# Patient Record
Sex: FEMALE | Race: BLACK OR AFRICAN AMERICAN | Employment: FULL TIME | URBAN - METROPOLITAN AREA
[De-identification: names, ages, dates, MRNs, and addresses within clinical notes are randomized per-mention and may not be internally consistent; named-entity substitution may affect disease eponyms.]

---

## 2017-01-12 ENCOUNTER — TELEPHONE (OUTPATIENT)
Dept: GYNECOLOGY | Age: 47
End: 2017-01-12

## 2017-02-24 ENCOUNTER — OFFICE VISIT (OUTPATIENT)
Dept: SURGERY | Age: 47
End: 2017-02-24

## 2017-02-24 VITALS
DIASTOLIC BLOOD PRESSURE: 81 MMHG | HEART RATE: 57 BPM | BODY MASS INDEX: 42.32 KG/M2 | SYSTOLIC BLOOD PRESSURE: 115 MMHG | HEIGHT: 65 IN | WEIGHT: 254 LBS

## 2017-02-24 DIAGNOSIS — Z86.718 HISTORY OF BLOOD CLOTS: ICD-10-CM

## 2017-02-24 DIAGNOSIS — I83.813 VARICOSE VEINS OF BOTH LOWER EXTREMITIES WITH PAIN: Primary | ICD-10-CM

## 2017-02-24 DIAGNOSIS — M79.605 PAIN IN BOTH LOWER EXTREMITIES: ICD-10-CM

## 2017-02-24 DIAGNOSIS — I83.93 SPIDER VEINS OF BOTH LOWER EXTREMITIES: ICD-10-CM

## 2017-02-24 DIAGNOSIS — M79.604 PAIN IN BOTH LOWER EXTREMITIES: ICD-10-CM

## 2017-02-24 PROCEDURE — 99213 OFFICE O/P EST LOW 20 MIN: CPT | Performed by: SURGERY

## 2017-02-24 RX ORDER — CLOTRIMAZOLE 1 %
CREAM (GRAM) TOPICAL
COMMUNITY
Start: 2017-02-20 | End: 2020-10-15 | Stop reason: ALTCHOICE

## 2017-02-24 RX ORDER — IBUPROFEN 200 MG
200 TABLET ORAL EVERY 6 HOURS PRN
COMMUNITY
End: 2017-08-03

## 2017-02-24 ASSESSMENT — ENCOUNTER SYMPTOMS: BACK PAIN: 1

## 2017-02-28 ENCOUNTER — TELEPHONE (OUTPATIENT)
Dept: SURGERY | Age: 47
End: 2017-02-28

## 2017-03-13 ENCOUNTER — TELEPHONE (OUTPATIENT)
Dept: SURGERY | Age: 47
End: 2017-03-13

## 2017-03-14 ENCOUNTER — TELEPHONE (OUTPATIENT)
Dept: SURGERY | Age: 47
End: 2017-03-14

## 2017-03-15 ENCOUNTER — TELEPHONE (OUTPATIENT)
Dept: SURGERY | Age: 47
End: 2017-03-15

## 2017-04-03 ENCOUNTER — TELEPHONE (OUTPATIENT)
Dept: SURGERY | Age: 47
End: 2017-04-03

## 2017-05-02 ENCOUNTER — OFFICE VISIT (OUTPATIENT)
Dept: SURGERY | Age: 47
End: 2017-05-02

## 2017-05-02 VITALS
SYSTOLIC BLOOD PRESSURE: 114 MMHG | HEIGHT: 65 IN | WEIGHT: 249.8 LBS | DIASTOLIC BLOOD PRESSURE: 75 MMHG | HEART RATE: 61 BPM | BODY MASS INDEX: 41.62 KG/M2

## 2017-05-02 DIAGNOSIS — M79.605 PAIN IN BOTH LOWER EXTREMITIES: Primary | ICD-10-CM

## 2017-05-02 DIAGNOSIS — D68.59 HYPERCOAGULABLE STATE (HCC): ICD-10-CM

## 2017-05-02 DIAGNOSIS — M79.604 PAIN IN BOTH LOWER EXTREMITIES: Primary | ICD-10-CM

## 2017-05-02 DIAGNOSIS — I63.9 CEREBROVASCULAR ACCIDENT (CVA), UNSPECIFIED MECHANISM (HCC): ICD-10-CM

## 2017-05-02 DIAGNOSIS — I83.813 VARICOSE VEINS OF BOTH LOWER EXTREMITIES WITH PAIN: ICD-10-CM

## 2017-05-02 DIAGNOSIS — Z86.718 HISTORY OF BLOOD CLOTS: ICD-10-CM

## 2017-05-02 PROCEDURE — 99214 OFFICE O/P EST MOD 30 MIN: CPT | Performed by: SURGERY

## 2017-05-02 ASSESSMENT — ENCOUNTER SYMPTOMS
EYE REDNESS: 0
RESPIRATORY NEGATIVE: 1
EYE DISCHARGE: 0
GASTROINTESTINAL NEGATIVE: 1
EYE PAIN: 0
EYE ITCHING: 0
PHOTOPHOBIA: 0
ALLERGIC/IMMUNOLOGIC NEGATIVE: 1

## 2017-10-19 ENCOUNTER — TELEPHONE (OUTPATIENT)
Dept: SURGERY | Age: 47
End: 2017-10-19

## 2017-10-19 NOTE — TELEPHONE ENCOUNTER
Returned the call tot he patient, patient requested to be seen as soon as next week indicating she still has pain of the bilateral calf's.  Patient has been scheduled for 10/24 at end of day

## 2017-10-24 ENCOUNTER — OFFICE VISIT (OUTPATIENT)
Dept: SURGERY | Age: 47
End: 2017-10-24

## 2017-10-24 VITALS
HEART RATE: 71 BPM | HEIGHT: 65 IN | SYSTOLIC BLOOD PRESSURE: 109 MMHG | WEIGHT: 271 LBS | BODY MASS INDEX: 45.15 KG/M2 | DIASTOLIC BLOOD PRESSURE: 78 MMHG

## 2017-10-24 DIAGNOSIS — I83.813 VARICOSE VEINS OF BOTH LOWER EXTREMITIES WITH PAIN: Primary | ICD-10-CM

## 2017-10-24 DIAGNOSIS — M79.604 PAIN IN BOTH LOWER EXTREMITIES: ICD-10-CM

## 2017-10-24 DIAGNOSIS — D68.59 HYPERCOAGULABLE STATE (HCC): ICD-10-CM

## 2017-10-24 DIAGNOSIS — M79.605 PAIN IN BOTH LOWER EXTREMITIES: ICD-10-CM

## 2017-10-24 DIAGNOSIS — Z86.718 HISTORY OF BLOOD CLOTS: ICD-10-CM

## 2017-10-24 PROCEDURE — G8417 CALC BMI ABV UP PARAM F/U: HCPCS | Performed by: SURGERY

## 2017-10-24 PROCEDURE — G8427 DOCREV CUR MEDS BY ELIG CLIN: HCPCS | Performed by: SURGERY

## 2017-10-24 PROCEDURE — 99213 OFFICE O/P EST LOW 20 MIN: CPT | Performed by: SURGERY

## 2017-10-24 PROCEDURE — G8484 FLU IMMUNIZE NO ADMIN: HCPCS | Performed by: SURGERY

## 2017-10-24 PROCEDURE — G8598 ASA/ANTIPLAT THER USED: HCPCS | Performed by: SURGERY

## 2017-10-24 PROCEDURE — 1036F TOBACCO NON-USER: CPT | Performed by: SURGERY

## 2017-10-24 RX ORDER — SULFAMETHOXAZOLE AND TRIMETHOPRIM 800; 160 MG/1; MG/1
1 TABLET ORAL 2 TIMES DAILY
Qty: 20 TABLET | Refills: 0 | Status: SHIPPED | OUTPATIENT
Start: 2017-10-24 | End: 2017-11-03

## 2017-10-24 ASSESSMENT — ENCOUNTER SYMPTOMS
EYES NEGATIVE: 1
RESPIRATORY NEGATIVE: 1
GASTROINTESTINAL NEGATIVE: 1

## 2017-10-24 NOTE — PROGRESS NOTES
Daily Progress Note   Klarissa Puente MD      10/24/2017    Chief Complaint   Patient presents with    Leg Swelling     patient was in ED on 10/18/17 for Left leg swelling. Patient reports pain in both of her legs. She states that her toes go numb also. She rates her pain 3/10. HISTORY OF PRESENT ILLNESS:                The patient is a 50 y.o. female who presents with Left leg pain and swelling. Patient has been seen in the ED as of 10/18/2017 with venous duplex study being perfomed with a result of negative DVT, she reports having pain of bilateral LE also mentioning numbness to her toes, today she states she has mild pain to the LE rating the pain she is experincing 3/10. Patient states its has become painful to sleep with the pillow lodged between her leg. Patient expresses that the Let ankle is swollen in the AM when she awakens. Patient does have Hx of Thrombosis (at  no long term treatment may have been superficial),and has a reported Hx Hypercoagulable state. Patient had been approved for the following procedure of:   Left Leg Ligation, Division and Complete Stripping of the Lesser Saphenous Vein with Stab Phlebectomies, Patient had put this procedure off for some time for unknown reasons.       Past Medical History:   Diagnosis Date    Anemia     Clotting disorder ([address]     Depression     Fibroid     Hx of blood clots     Rt leg lovenox but no long term RX at     STD (sexually transmitted disease)        Past Surgical History:   Procedure Laterality Date    FINGER SURGERY      TUBAL LIGATION         Social History     Social History    Marital status: Single     Spouse name: N/A    Number of children: N/A    Years of education: N/A     Occupational History    .       Social History Main Topics    Smoking status: Never Smoker    Smokeless tobacco: Never Used    Alcohol use No    Drug use: No    Sexual activity: No     Other Topics Concern    Not on file - 0.2 K/uL Final    Sodium 10/18/2017 137  136 - 145 mmol/L Final    Potassium 10/18/2017 3.9  3.5 - 5.1 mmol/L Final    Chloride 10/18/2017 102  99 - 110 mmol/L Final    CO2 10/18/2017 22  21 - 32 mmol/L Final    Anion Gap 10/18/2017 13  3 - 16 Final    Glucose 10/18/2017 110* 70 - 99 mg/dL Final    BUN 10/18/2017 11  7 - 20 mg/dL Final    CREATININE 10/18/2017 0.5* 0.6 - 1.1 mg/dL Final    GFR Non- 10/18/2017 >60  >60 Final    Comment: >60 mL/min/1.73m2 EGFR, calc. for ages 25 and older using the  MDRD formula (not corrected for weight), is valid for stable  renal function.  GFR  10/18/2017 >60  >60 Final    Comment: Chronic Kidney Disease: less than 60 ml/min/1.73 sq.m. Kidney Failure: less than 15 ml/min/1.73 sq.m. Results valid for patients 18 years and older.  Calcium 10/18/2017 8.7  8.3 - 10.6 mg/dL Final    Total Protein 10/18/2017 7.1  6.4 - 8.2 g/dL Final    Alb 10/18/2017 3.3* 3.4 - 5.0 g/dL Final    Albumin/Globulin Ratio 10/18/2017 0.9* 1.1 - 2.2 Final    Total Bilirubin 10/18/2017 0.3  0.0 - 1.0 mg/dL Final    Alkaline Phosphatase 10/18/2017 64  40 - 129 U/L Final    ALT 10/18/2017 11  10 - 40 U/L Final    AST 10/18/2017 14* 15 - 37 U/L Final    Globulin 10/18/2017 3.8  g/dL Final    Protime 10/18/2017 11.3  9.6 - 13.0 sec Final    Comment: Effective 02-15-17 9:00am EST  Please note reference ranges have  changed for PT and INR Testing.  INR 10/18/2017 1.00  0.85 - 1.15 Final    Comment: Effective 08/01/2017 at 3pm EST    Normal: 0.86 - 1.14  Therapeutic: 2.0 - 3.0  Pros. Valve: 2.5 - 3.5  AMI: 2.0 - 3.0      aPTT 10/18/2017 29.4  24.1 - 34.9 sec Final    Comment: Therapeutic range: 52.4 - 87.3 sec    Effective 7-12-17 9:00am EST  Please note reference ranges have  changed for PTT Testing.       hCG Qual 10/18/2017 Negative  Detects HCG level >10 MIU/mL Final       Vl Extremity Venous Duplex Left    Result Date: 10/18/2017  Lower Extremities DVT Study  Demographics   Patient Name      Nida West   Date of Study     10/18/2017          Gender              Female   Patient Number    [id number]          Date of Birth       1970   Visit Number      [id number]         Age                 50 year(s)   Accession Number  [id number]           Room Number         6770   Corporate ID      [id number]            [address] [place] [address], [address] [place] [address],                                                            [address] [place] [address],           [facility]  Physician         Tesfaye Thompson [place]       Physician           Jaret Ruby MD  Procedure Type of Study:   Veins:Lower Extremities DVT Study, VL EXTREMITY VENOUS DUPLEX LEFT. Vascular Sonographer Report  Indications for Study:Pain, edema, discoloration. Additional Indications:Patient with left leg edema and pain for 1 week duration. Impressions Right Impression There is no evidence of deep venous thrombosis involving the common femoral vein. Left Impression There is no evidence of deep or superficial venous thrombosis involving the left lower extremity. Varicosities left mid to upper calf are patent. Conclusions   Summary   Varicosities left mid to upper calf are patent. no dvt   Signature   ------------------------------------------------------------------  Electronically signed by Jaret Ruby MD (Interpreting  physician) on 10/18/2017 at 02:43 PM  ------------------------------------------------------------------  Patient Status:STAT. Study [place] [id number] - Vascular Lab. Technical Quality:Good visualization.   - Results were reported to:ER 1030am 10/18/2017. Velocities are measured in cm/s ; Diameters are measured in mm Right Lower Extremities DVT Study Measurements Right 2D Measurements +--------------+----------+---------------+----------+ ! Location      ! Visualized! Compressibility! Thrombosis! +--------------+----------+---------------+----------+ ! Common Femoral!Yes       ! Yes            ! None      ! +--------------+----------+---------------+----------+ Right Doppler Measurements +--------------+------+------+------------+ ! Location      ! Signal!Reflux! Reflux (sec)! +--------------+------+------+------------+ ! Common Femoral!Phasic!      !            ! +--------------+------+------+------------+ Left Lower Extremities DVT Study Measurements Left 2D Measurements +------------------------+----------+---------------+----------+ ! Location                ! Visualized! Compressibility! Thrombosis! +------------------------+----------+---------------+----------+ ! Sapheno Femoral Junction! Yes       ! Yes            ! None      ! +------------------------+----------+---------------+----------+ ! GSV Thigh               ! Yes       ! Yes            ! None      ! +------------------------+----------+---------------+----------+ ! Common Femoral          !Yes       ! Yes            ! None      ! +------------------------+----------+---------------+----------+ ! Prox Femoral            !Yes       ! Yes            ! None      ! +------------------------+----------+---------------+----------+ ! Mid Femoral             !Yes       ! Yes            ! None      ! +------------------------+----------+---------------+----------+ ! Dist Femoral            !Yes       ! Yes            ! None      ! +------------------------+----------+---------------+----------+ ! Deep Femoral            !Yes       ! Yes            ! None      ! +------------------------+----------+---------------+----------+ ! Popliteal               !Yes       ! Yes            ! None      ! +------------------------+----------+---------------+----------+ ! GSV Below Knee          ! Yes       ! Yes            ! None      ! +------------------------+----------+---------------+----------+ ! Gastroc                 ! Yes       ! Yes            ! None      ! +------------------------+----------+---------------+----------+ ! Soleal                  !Yes       ! Yes            ! None      ! +------------------------+----------+---------------+----------+ ! PTV                     ! Yes       ! Yes            ! None      ! +------------------------+----------+---------------+----------+ ! ATV                     ! Yes       ! Yes            ! None      ! +------------------------+----------+---------------+----------+ ! Peroneal                !Yes       ! Yes            ! None      ! +------------------------+----------+---------------+----------+ ! GSV Calf                ! Yes       ! Yes            ! None      ! +------------------------+----------+---------------+----------+ ! SSV                     ! Yes       ! Yes            ! None      ! +------------------------+----------+---------------+----------+ Left Doppler Measurements +--------------+------+------+------------+ ! Location      ! Signal!Reflux! Reflux (sec)! +--------------+------+------+------------+ ! Common Femoral!Phasic!      !            ! +--------------+------+------+------------+ ! Popliteal     !Phasic!      !            ! +--------------+------+------+------------+      Review of Systems   Constitutional: Negative. HENT: Negative. Eyes: Negative. Respiratory: Negative. Cardiovascular: Positive for leg swelling. Negative for chest pain and palpitations. Gastrointestinal: Negative. Endocrine: Negative. Genitourinary: Negative. All other systems reviewed and are negative. Physical Exam   Constitutional: She is oriented to person, place, and time. Vital signs are normal. She appears well-developed and well-nourished. Non-toxic appearance. She does not have a sickly appearance. She does not appear ill. No distress. HENT:   Head: Normocephalic and atraumatic. Right Ear: External ear normal.   Left Ear: External ear normal.   Eyes: Pupils are equal, round, and reactive to light.  No scleral icterus. Neck: Normal range of motion. Neck supple. Normal carotid pulses and no JVD present. Carotid bruit is not present. No tracheal deviation, no edema and no erythema present. No thyromegaly present. Cardiovascular: Normal rate, regular rhythm, S1 normal, normal heart sounds and intact distal pulses. No murmur heard. Pulses:       Radial pulses are 1+ on the right side, and 2+ on the left side. Femoral pulses are 2+ on the right side, and 2+ on the left side. Popliteal pulses are 0 on the left side. Dorsalis pedis pulses are 2+ on the right side, and 1+ on the left side. Posterior tibial pulses are 0 on the right side, and 0 on the left side. MEASUREMENTS 10/24/2017:    RIGHT ANKLE: 23.3 cm  RIGHT CALF: 44.5 cm    LEFT ANKLE: 28 cm  LEFT CALF: 46 cm      MEASUREMENTS 5/2/2017:  RIGHT ANKLE: 23.8 cm   RIGHT CALF: 45.8 cm     LEFT ANKLE:  26.6 cm  LEFT CALF: 45.7 cm      Measurements 10/25/2016:  Rt ankle: 25.1 cm  Rt calf: 46.7 cm    Lt ankle: 27.8 cm  Lt calf: 46.1 cm    LEG MEASUREMENTS 2/24/17  RIGHT ANKLE:  24.3 CM              CALF:  45.6 CM  LEFT ANKLE:  28.2 CM            CALF:  46.0 CM       Pulmonary/Chest: Effort normal and breath sounds normal. No accessory muscle usage or stridor. No tachypnea. No respiratory distress. She has no wheezes. She has no rales. Abdominal: Soft. Bowel sounds are normal. She exhibits no distension, no abdominal bruit and no mass. There is no hepatosplenomegaly. There is no tenderness. There is no rebound, no guarding and no CVA tenderness. No hernia. Hernia confirmed negative in the ventral area, confirmed negative in the right inguinal area and confirmed negative in the left inguinal area. Genitourinary:   Genitourinary Comments: Rectal exam/stool guaiac not indicated. Musculoskeletal: She exhibits no tenderness. Right shoulder: She exhibits normal range of motion, no swelling, no deformity and no pain.         Right

## 2017-11-17 ENCOUNTER — OFFICE VISIT (OUTPATIENT)
Dept: SURGERY | Age: 47
End: 2017-11-17

## 2017-11-17 VITALS
HEIGHT: 61 IN | DIASTOLIC BLOOD PRESSURE: 63 MMHG | WEIGHT: 253 LBS | BODY MASS INDEX: 47.77 KG/M2 | HEART RATE: 69 BPM | SYSTOLIC BLOOD PRESSURE: 108 MMHG

## 2017-11-17 DIAGNOSIS — I83.93 SPIDER VEINS OF BOTH LOWER EXTREMITIES: ICD-10-CM

## 2017-11-17 DIAGNOSIS — D68.59 HYPERCOAGULABLE STATE (HCC): ICD-10-CM

## 2017-11-17 DIAGNOSIS — M79.604 PAIN IN BOTH LOWER EXTREMITIES: Primary | ICD-10-CM

## 2017-11-17 DIAGNOSIS — I83.813 VARICOSE VEINS OF BOTH LOWER EXTREMITIES WITH PAIN: ICD-10-CM

## 2017-11-17 DIAGNOSIS — M79.605 PAIN IN BOTH LOWER EXTREMITIES: Primary | ICD-10-CM

## 2017-11-17 DIAGNOSIS — Z86.718 HISTORY OF BLOOD CLOTS: ICD-10-CM

## 2017-11-17 PROCEDURE — 99213 OFFICE O/P EST LOW 20 MIN: CPT | Performed by: SURGERY

## 2017-11-17 ASSESSMENT — ENCOUNTER SYMPTOMS
RESPIRATORY NEGATIVE: 1
GASTROINTESTINAL NEGATIVE: 1
EYES NEGATIVE: 1

## 2017-11-17 NOTE — PROGRESS NOTES
Daily Progress Note   Colonel Siemens, MD      11/17/2017    Chief Complaint   Patient presents with    Follow-up     Patient reports having an area on her left calf that she is concerned about. She reports finishing up the Bactrim and the area has not gotten better. 4/10 calf pain. Keeps her up at night. Patient is also c/o burning in her feet and buttock pain. HISTORY OF PRESENT ILLNESS:                The patient is a 50 y.o. female who presents with increased pain to the Left LE after previous visit 3 weeks ago. Patient states she does not take coumadin, but does currently taking aspirin. Patient states she has continued to apply the ace bandages to the LE, she denies any recent injuries to the Left LE. Patient does state she has experienced extreme pain of the LE at night, indicating the pain does wake her up, also states she does have pain of the Left Groin. Patient does mention the left groin region is painful with applied pressure as well as when she would cough.        Past Medical History:   Diagnosis Date    Anemia     Clotting disorder ([address]     Depression     Fibroid     Hx of blood clots     Rt leg lovenox but no long term RX at     STD (sexually transmitted disease)        Past Surgical History:   Procedure Laterality Date    FINGER SURGERY      TUBAL LIGATION         Social History     Social History    Marital status: Single     Spouse name: N/A    Number of children: N/A    Years of education: N/A     Occupational History    .       Social History Main Topics    Smoking status: Never Smoker    Smokeless tobacco: Never Used    Alcohol use No    Drug use: No    Sexual activity: No     Other Topics Concern    Not on file     Social History Narrative    No narrative on file       Family History   Problem Relation Age of Onset    Cancer Mother     Rheum Arthritis Mother          Current Outpatient Prescriptions:     naproxen (NAPROSYN) 500 MG tablet, Take 1 tablet by mouth 2 times daily as needed for Pain, Disp: 30 tablet, Rfl: 0    clotrimazole (LOTRIMIN) 1 % cream, , Disp: , Rfl:     vitamin D (CHOLECALCIFEROL) 1000 UNIT TABS tablet, Take 1,000 Units by mouth daily, Disp: , Rfl:     ferrous sulfate 325 (65 FE) MG tablet, Take 325 mg by mouth daily (with breakfast), Disp: , Rfl:     aspirin 81 MG chewable tablet, Take 81 mg by mouth, Disp: , Rfl:     Codeine    Vitals:    11/17/17 1356   BP: 108/63   Pulse: 69   Weight: 253 lb (114.8 kg)   Height: 5' 1\" (1.549 m)       Admission on 10/18/2017, Discharged on 10/18/2017   Component Date Value Ref Range Status    WBC 10/18/2017 6.3  4.0 - 11.0 K/uL Final    RBC 10/18/2017 4.14  4.00 - 5.20 M/uL Final    Hemoglobin 10/18/2017 10.3* 12.0 - 16.0 g/dL Final    Hematocrit 10/18/2017 32.8* 36.0 - 48.0 % Final    MCV 10/18/2017 79.0* 80.0 - 100.0 fL Final    MCH 10/18/2017 24.8* 26.0 - 34.0 pg Final    MCHC 10/18/2017 31.3  31.0 - 36.0 g/dL Final    RDW 10/18/2017 15.2  12.4 - 15.4 % Final    Platelets 82/79/0441 334  135 - 450 K/uL Final    MPV 10/18/2017 7.0  5.0 - 10.5 fL Final    Neutrophils % 10/18/2017 57.1  % Final    Lymphocytes % 10/18/2017 29.3  % Final    Monocytes % 10/18/2017 8.4  % Final    Eosinophils % 10/18/2017 4.4  % Final    Basophils % 10/18/2017 0.8  % Final    Neutrophils # 10/18/2017 3.6  1.7 - 7.7 K/uL Final    Lymphocytes # 10/18/2017 1.9  1.0 - 5.1 K/uL Final    Monocytes # 10/18/2017 0.5  0.0 - 1.3 K/uL Final    Eosinophils # 10/18/2017 0.3  0.0 - 0.6 K/uL Final    Basophils # 10/18/2017 0.0  0.0 - 0.2 K/uL Final    Sodium 10/18/2017 137  136 - 145 mmol/L Final    Potassium 10/18/2017 3.9  3.5 - 5.1 mmol/L Final    Chloride 10/18/2017 102  99 - 110 mmol/L Final    CO2 10/18/2017 22  21 - 32 mmol/L Final    Anion Gap 10/18/2017 13  3 - 16 Final    Glucose 10/18/2017 110* 70 - 99 mg/dL Final    BUN 10/18/2017 11  7 - 20 mg/dL Final    CREATININE 10/18/2017 0.5* 0.6 - 1.1 mg/dL Final    GFR Non- 10/18/2017 >60  >60 Final    Comment: >60 mL/min/1.73m2 EGFR, calc. for ages 25 and older using the  MDRD formula (not corrected for weight), is valid for stable  renal function.  GFR  10/18/2017 >60  >60 Final    Comment: Chronic Kidney Disease: less than 60 ml/min/1.73 sq.m. Kidney Failure: less than 15 ml/min/1.73 sq.m. Results valid for patients 18 years and older.  Calcium 10/18/2017 8.7  8.3 - 10.6 mg/dL Final    Total Protein 10/18/2017 7.1  6.4 - 8.2 g/dL Final    Alb 10/18/2017 3.3* 3.4 - 5.0 g/dL Final    Albumin/Globulin Ratio 10/18/2017 0.9* 1.1 - 2.2 Final    Total Bilirubin 10/18/2017 0.3  0.0 - 1.0 mg/dL Final    Alkaline Phosphatase 10/18/2017 64  40 - 129 U/L Final    ALT 10/18/2017 11  10 - 40 U/L Final    AST 10/18/2017 14* 15 - 37 U/L Final    Globulin 10/18/2017 3.8  g/dL Final    Protime 10/18/2017 11.3  9.6 - 13.0 sec Final    Comment: Effective 02-15-17 9:00am EST  Please note reference ranges have  changed for PT and INR Testing.  INR 10/18/2017 1.00  0.85 - 1.15 Final    Comment: Effective 08/01/2017 at 3pm EST    Normal: 0.86 - 1.14  Therapeutic: 2.0 - 3.0  Pros. Valve: 2.5 - 3.5  AMI: 2.0 - 3.0      aPTT 10/18/2017 29.4  24.1 - 34.9 sec Final    Comment: Therapeutic range: 52.4 - 87.3 sec    Effective 7-12-17 9:00am EST  Please note reference ranges have  changed for PTT Testing.  hCG Qual 10/18/2017 Negative  Detects HCG level >10 MIU/mL Final       No results found. Review of Systems   Constitutional: Negative. HENT: Negative. Eyes: Negative. Respiratory: Negative. Cardiovascular: Positive for leg swelling. Negative for chest pain and palpitations. Gastrointestinal: Negative. Endocrine: Negative. Genitourinary: Negative. All other systems reviewed and are negative. Physical Exam   Constitutional: She is oriented to person, place, and time.  Vital signs are rebound, no guarding and no CVA tenderness. No hernia. Hernia confirmed negative in the ventral area, confirmed negative in the right inguinal area and confirmed negative in the left inguinal area. Genitourinary:   Genitourinary Comments: Rectal exam/stool guaiac not indicated. Musculoskeletal: She exhibits no tenderness. Right shoulder: She exhibits normal range of motion, no swelling, no deformity and no pain. Right lower leg: She exhibits swelling ( Lt>Rt) and edema. Left lower leg: She exhibits swelling ( Lt>Rt) and edema. Legs:       Feet:    Lymphadenopathy:        Head (right side): No submandibular, no preauricular, no posterior auricular and no occipital adenopathy present. Head (left side): No submandibular, no preauricular, no posterior auricular and no occipital adenopathy present. She has no cervical adenopathy. Right cervical: No superficial cervical, no deep cervical and no posterior cervical adenopathy present. Left cervical: No superficial cervical, no deep cervical and no posterior cervical adenopathy present. Right axillary: No pectoral and no lateral adenopathy present. Left axillary: No pectoral and no lateral adenopathy present. Right: No inguinal and no supraclavicular adenopathy present. Left: No inguinal and no supraclavicular adenopathy present. Neurological: She is oriented to person, place, and time. She displays no atrophy. No cranial nerve deficit or sensory deficit. She exhibits normal muscle tone. Coordination and gait normal.   Skin: Skin is warm and dry. No bruising, no laceration, no lesion and no rash noted. No cyanosis or erythema. No pallor. Left lateral le cm x 17 cm   Psychiatric: She has a normal mood and affect.  Her speech is normal and behavior is normal. Judgment and thought content normal. Cognition and memory are normal.         ASSESSMENT:    Problem List Items Addressed

## 2017-11-20 ENCOUNTER — TELEPHONE (OUTPATIENT)
Dept: SURGERY | Age: 47
End: 2017-11-20

## 2017-11-20 DIAGNOSIS — M79.604 PAIN IN BOTH LOWER EXTREMITIES: Primary | ICD-10-CM

## 2017-11-20 DIAGNOSIS — M79.605 PAIN IN BOTH LOWER EXTREMITIES: Primary | ICD-10-CM

## 2017-11-20 DIAGNOSIS — Z86.718 HISTORY OF BLOOD CLOTS: ICD-10-CM

## 2017-11-22 ENCOUNTER — HOSPITAL ENCOUNTER (OUTPATIENT)
Dept: CT IMAGING | Age: 47
Discharge: HOME | End: 2017-11-22
Attending: SURGERY | Admitting: SURGERY

## 2017-11-22 DIAGNOSIS — Z86.718 HISTORY OF BLOOD CLOTS: ICD-10-CM

## 2017-11-22 DIAGNOSIS — M79.604 PAIN IN BOTH LOWER EXTREMITIES: ICD-10-CM

## 2017-11-22 DIAGNOSIS — M79.604 PAIN OF RIGHT LEG: ICD-10-CM

## 2017-11-22 DIAGNOSIS — M79.605 PAIN IN BOTH LOWER EXTREMITIES: ICD-10-CM

## 2017-11-27 ENCOUNTER — TELEPHONE (OUTPATIENT)
Dept: SURGERY | Age: 47
End: 2017-11-27

## 2017-11-28 ENCOUNTER — TELEPHONE (OUTPATIENT)
Dept: SURGERY | Age: 47
End: 2017-11-28

## 2020-07-21 ENCOUNTER — OFFICE VISIT (OUTPATIENT)
Dept: SURGERY | Age: 50
End: 2020-07-21
Payer: COMMERCIAL

## 2020-07-21 VITALS — DIASTOLIC BLOOD PRESSURE: 78 MMHG | SYSTOLIC BLOOD PRESSURE: 116 MMHG | HEART RATE: 81 BPM

## 2020-07-21 PROBLEM — Z15.89 HETEROZYGOUS MTHFR MUTATION A1298C: Status: ACTIVE | Noted: 2020-07-21

## 2020-07-21 PROCEDURE — G8427 DOCREV CUR MEDS BY ELIG CLIN: HCPCS | Performed by: SURGERY

## 2020-07-21 PROCEDURE — G8421 BMI NOT CALCULATED: HCPCS | Performed by: SURGERY

## 2020-07-21 PROCEDURE — 99204 OFFICE O/P NEW MOD 45 MIN: CPT | Performed by: SURGERY

## 2020-07-21 PROCEDURE — 3017F COLORECTAL CA SCREEN DOC REV: CPT | Performed by: SURGERY

## 2020-07-21 PROCEDURE — 1036F TOBACCO NON-USER: CPT | Performed by: SURGERY

## 2020-07-21 ASSESSMENT — ENCOUNTER SYMPTOMS
RESPIRATORY NEGATIVE: 1
EYES NEGATIVE: 1
GASTROINTESTINAL NEGATIVE: 1
ALLERGIC/IMMUNOLOGIC NEGATIVE: 1
BACK PAIN: 0

## 2020-07-21 NOTE — PROGRESS NOTES
Daily Progress Note   Raudel Whitlock MD      7/21/2020    Chief Complaint   Patient presents with    Leg Pain     Former patient from 2017. Patient reports bilateral leg pain, left is worse. Mainly in her ankles and calfs, going up to her thigh. Patient reports 6/10 pain today. She states its waking her up at night. Its affecting her waking as well.  Leg Swelling     Patient reports bilateral leg swelling. HISTORY OF PRESENT ILLNESS:                The patient is a 44 y.o. female who presents with bilateral leg pain. She was a patient in 2017. At that time she was approved for surgery on her varicose veins, but she didn't have the surgery because she lost her insurance. Since Ms. Jessica Roger was here last, she was on anti-coagulants for a DVT, although we can't find a report that shows a DVT. 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 Ms. Jessica Roger says she wears surgical stockings. Ms. Jessica Roger has trouble walking. She had to stop coming into the office from the parking lot. She says her right knee is bone on bone.          Past Medical History:   Diagnosis Date    Anemia     [facility] [address] [address]     Depression     Fibroid     Hx of blood clots     Rt leg lovenox but no long term RX at     STD (sexually transmitted disease)        Past Surgical History:   Procedure Laterality Date    FINGER SURGERY      TUBAL LIGATION         Social History     Socioeconomic History    Marital status: Single     Spouse name: Not on file    Number of children: Not on file    Years of education: Not on file    Highest education level: Not on file   Occupational History    Occupation: .    Social Needs    Financial resource strain: Not on file    Food insecurity     Worry: Not on file     Inability: Not on file    Transportation needs     Medical: Not on file     Non-medical: Not on file   Tobacco Use    Smoking status: Never Smoker    Smokeless tobacco: Never Used Substance and Sexual Activity    Alcohol use: No     Alcohol/week: 0.0 standard drinks    Drug use: No    Sexual activity: Never   Lifestyle    Physical activity     Days per week: Not on file     Minutes per session: Not on file    Stress: Not on file   Relationships    Social connections     Talks on phone: Not on file     Gets together: Not on file     Attends [employer] service: Not on file     Active member of club or organization: Not on file     Attends meetings of clubs or organizations: Not on file     Relationship status: Not on file    Intimate partner violence     Fear of current or ex partner: Not on file     Emotionally abused: Not on file     Physically abused: Not on file     Forced sexual activity: Not on file   Other Topics Concern    Not on file   Social History Narrative    Not on file       Family History   Problem Relation Age of Onset    Cancer Mother     Rheum Arthritis Mother          Current Outpatient Medications:     albuterol sulfate HFA (PROVENTIL HFA) 108 (90 Base) MCG/ACT inhaler, Inhale 2 puffs into the lungs every 6 hours as needed for Wheezing, Disp: 1 Inhaler, Rfl: 0    vitamin D (CHOLECALCIFEROL) 1000 UNIT TABS tablet, Take 1,000 Units by mouth daily, Disp: , Rfl:     ferrous sulfate 325 (65 FE) MG tablet, Take 325 mg by mouth daily (with breakfast), Disp: , Rfl:     ondansetron (ZOFRAN ODT) 4 MG disintegrating tablet, Take 1 tablet by mouth every 8 hours as needed for Nausea (Patient not taking: Reported on 7/21/2020), Disp: 20 tablet, Rfl: 0    naproxen (NAPROSYN) 500 MG tablet, Take 1 tablet by mouth 2 times daily as needed for Pain (Patient not taking: Reported on 7/21/2020), Disp: 30 tablet, Rfl: 0    clotrimazole (LOTRIMIN) 1 % cream, , Disp: , Rfl:     aspirin 81 MG chewable tablet, Take 81 mg by mouth, Disp: , Rfl:     Codeine    Vitals:    07/21/20 1340   BP: 116/78   Pulse: 81       Admission on 01/19/2018, Discharged on 01/19/2018   Component Date Value Ref Range Status    Ventricular Rate 01/19/2018 64  BPM Final    Atrial Rate 01/19/2018 64  BPM Final    P-R Interval 01/19/2018 182  ms Final    QRS Duration 01/19/2018 76  ms Final    Q-T Interval 01/19/2018 398  ms Final    QTc Calculation (Bazett) 01/19/2018 410  ms Final    P Axis 01/19/2018 41  degrees Final    R Axis 01/19/2018 28  degrees Final    T Axis 01/19/2018 21  degrees Final    Diagnosis 01/19/2018    Final                    Value:Normal sinus rhythm  Normal ECG  When compared with ECG of 01-JAN-2018 13:01,  No significant change was found  Confirmed by ADENIKE QUINONES, Julia Jones ([id number]) on 1/19/2018 4:08:52 PM      Rapid Influenza A Ag 01/19/2018 Negative  Negative Final    Rapid Influenza B Ag 01/19/2018 Negative  Negative Final       Review of Systems   Constitutional: Negative. HENT: Negative. Eyes: Negative. Respiratory: Negative. Cardiovascular: Positive for leg swelling. Negative for chest pain and palpitations. Gastrointestinal: Negative. Endocrine: Negative. Genitourinary: Negative. Musculoskeletal: Positive for gait problem (leg and foot pain) and joint swelling. Negative for arthralgias, back pain, myalgias, neck pain and neck stiffness. Allergic/Immunologic: Negative. Hematological: Negative. Psychiatric/Behavioral: Negative. All other systems reviewed and are negative. Physical Exam  Vitals signs and nursing note reviewed. Constitutional:       Appearance: Normal appearance. She is well-developed. HENT:      Mouth/Throat:      Pharynx: No oropharyngeal exudate. Eyes:      Conjunctiva/sclera: Conjunctivae normal.      Pupils: Pupils are equal, round, and reactive to light. Neck:      Musculoskeletal: Normal range of motion. Cardiovascular:      Rate and Rhythm: Normal rate and regular rhythm. Pulses:           Carotid pulses are 2+ on the right side and 2+ on the left side. Femoral pulses are 2+ on the right side. Dorsalis pedis pulses are 2+ on the right side and 1+ on the left side. Posterior tibial pulses are 2+ on the right side and 0 on the left side. Heart sounds: Normal heart sounds. Comments:     MEASUREMENTS 7/21/2020 :    RIGHT ANKLE: 24.6 cm   RIGHT CALF: 46.3 cm     LEFT ANKLE: 27.2 cm   LEFT CALF: 45.2 cm     Doppler 7/21/2020:  Rt DP: biphasic  Rt PT: biphasic  Rt AT:     Lt DP: biphasic  Lt PT: triphasic  Lt AT:    Pulmonary:      Effort: Pulmonary effort is normal.      Breath sounds: Normal breath sounds. Abdominal:      General: Bowel sounds are normal.   Musculoskeletal: Normal range of motion. Legs:    Neurological:      Mental Status: She is alert and oriented to person, place, and time. Deep Tendon Reflexes: Reflexes are normal and symmetric. Psychiatric:         Speech: Speech normal.         Behavior: Behavior normal.         Thought Content: Thought content normal.         Judgment: Judgment normal.       Ms Sarai Acosta has four children. She has a clotting disorder: MTHFR [id number] sounds like when I look to that this is similar to homocystine disorder  This is a genetic mutation that can cause blood clots, strokes, embolisms as well as other problems. She did have a TIA after working on an infected tooth pulling it over [employer] [facility]. As I recall it lasted longer than 24 hours  ASSESSMENT:    Problem List Items Addressed This Visit     Varicose veins of both lower extremities with pain - Primary    Pain in both lower extremities    Hypercoagulable state ([address]    History of blood clots    Heterozygous MTHFR mutation [id number] ([address]    Cerebrovascular accident (CVA) ([address]        Words having more DVTs however we found 3 ultrasounds on care everywhere about a year apart all negative  PLAN:    Ms. Sarai Acosta needs to have a bilateral lower extremity reflux scan and office visit. She also needs to wear her compression hose daily, taking off at night.  We will give her a prescription for compression stockings. 2017 she got approved for vein surgery then she said she either lost her nerve or lost her insurance or both    I Tressa Good am scribing for and in the presence of Kelsey Turner MD on this date of 07/21/20    I Nadia Gonzales MD personally performed the services described in this documentation as scribed by the Medical Assistant Soledad Denis in my presence and it is both accurate and complete.         Electronically signed by Kelsey Turner MD on 7/21/2020 at 4:59 PM

## 2020-08-13 ENCOUNTER — PROCEDURE VISIT (OUTPATIENT)
Dept: SURGERY | Age: 50
End: 2020-08-13
Payer: COMMERCIAL

## 2020-08-13 PROCEDURE — 93970 EXTREMITY STUDY: CPT | Performed by: SURGERY

## 2020-08-18 ENCOUNTER — OFFICE VISIT (OUTPATIENT)
Dept: SURGERY | Age: 50
End: 2020-08-18
Payer: COMMERCIAL

## 2020-08-18 VITALS
WEIGHT: 264 LBS | SYSTOLIC BLOOD PRESSURE: 120 MMHG | DIASTOLIC BLOOD PRESSURE: 82 MMHG | HEIGHT: 65 IN | BODY MASS INDEX: 43.99 KG/M2

## 2020-08-18 PROCEDURE — 1036F TOBACCO NON-USER: CPT | Performed by: SURGERY

## 2020-08-18 PROCEDURE — 99214 OFFICE O/P EST MOD 30 MIN: CPT | Performed by: SURGERY

## 2020-08-18 PROCEDURE — 3017F COLORECTAL CA SCREEN DOC REV: CPT | Performed by: SURGERY

## 2020-08-18 PROCEDURE — G8427 DOCREV CUR MEDS BY ELIG CLIN: HCPCS | Performed by: SURGERY

## 2020-08-18 PROCEDURE — G8417 CALC BMI ABV UP PARAM F/U: HCPCS | Performed by: SURGERY

## 2020-08-18 ASSESSMENT — ENCOUNTER SYMPTOMS
BACK PAIN: 0
EYES NEGATIVE: 1
RESPIRATORY NEGATIVE: 1
GASTROINTESTINAL NEGATIVE: 1
ALLERGIC/IMMUNOLOGIC NEGATIVE: 1

## 2020-08-18 NOTE — PATIENT INSTRUCTIONS
Ms. KNOTT [facility] will need left leg greater saphenous vein VNUS , left leg lesser saphenous vein ligation, and stab phlebectomies. Because of her clotting disorder,    Heterozygous MTHFR mutation [id number]    she will need blood thinners after surgery. We will submit to her insurance for surgery.      Once insurance approval has been obtained, please get an history and physical from your primary care, and call us when this is scheduled so we can schedule your surgery and Covid-19 test.

## 2020-08-18 NOTE — PROGRESS NOTES
Daily Progress Note   Rosette Fernandez MD      8/18/2020    Chief Complaint   Patient presents with    Follow-up     discuss scan done on 8/13         HISTORY OF PRESENT ILLNESS:                The patient is a 44 y.o. female who presents with a follow up for a venous scan for varicose veins.  Since     Past Medical History:   Diagnosis Date    Anemia     Clotting disorder ([facility] [address]     Depression     Fibroid     Hx of blood clots     Rt leg lovenox but no long term RX at [facility] [address] STD (sexually transmitted disease)        Past Surgical History:   Procedure Laterality Date    FINGER SURGERY      TUBAL LIGATION         Social History     Socioeconomic History    Marital status: Single     Spouse name: Not on file    Number of children: Not on file    Years of education: Not on file    Highest education level: Not on file   Occupational History    Occupation: .    Social Needs    Financial resource strain: Not on file    Food insecurity     Worry: Not on file     Inability: Not on file    Transportation needs     Medical: Not on file     Non-medical: Not on file   Tobacco Use    Smoking status: Never Smoker    Smokeless tobacco: Never Used   Substance and Sexual Activity    Alcohol use: No     Alcohol/week: 0.0 standard drinks    Drug use: No    Sexual activity: Never   Lifestyle    Physical activity     Days per week: Not on file     Minutes per session: Not on file    Stress: Not on file   Relationships    Social connections     Talks on phone: Not on file     Gets together: Not on file     Attends Jewish service: Not on file     Active member of club or organization: Not on file     Attends meetings of clubs or organizations: Not on file     Relationship status: Not on file    Intimate partner violence     Fear of current or ex partner: Not on file     Emotionally abused: Not on file     Physically abused: Not on file     Forced sexual activity: Not on file   Other Topics Concern     Rapid Influenza B Ag 01/19/2018 Negative  Negative Final       Review of Systems   Constitutional: Negative. HENT: Negative. Eyes: Negative. Respiratory: Negative. Cardiovascular: Positive for leg swelling. Negative for chest pain and palpitations. Gastrointestinal: Negative. Endocrine: Negative. Genitourinary: Negative. Musculoskeletal: Positive for gait problem (leg and foot pain) and joint swelling. Negative for arthralgias, back pain, myalgias, neck pain and neck stiffness. Allergic/Immunologic: Negative. Hematological: Negative. Psychiatric/Behavioral: Negative. All other systems reviewed and are negative. Physical Exam  Vitals signs and nursing note reviewed. Constitutional:       Appearance: Normal appearance. She is well-developed. HENT:      Mouth/Throat:      Pharynx: No oropharyngeal exudate. Eyes:      Conjunctiva/sclera: Conjunctivae normal.      Pupils: Pupils are equal, round, and reactive to light. Neck:      Musculoskeletal: Normal range of motion. Cardiovascular:      Rate and Rhythm: Normal rate and regular rhythm. Pulses:           Carotid pulses are 2+ on the right side and 2+ on the left side. Femoral pulses are 2+ on the right side. Dorsalis pedis pulses are 2+ on the right side and 1+ on the left side. Posterior tibial pulses are 2+ on the right side and 0 on the left side. Heart sounds: Normal heart sounds. Comments:   MEASUREMENTS 8/18/2020:    RIGHT ANKLE: 24.8 cm  RIGHT CALF: 45.6 cm    LEFT ANKLE: 28.2 cm   LEFT CALF: 42.4 cm      MEASUREMENTS 7/21/2020 :    RIGHT ANKLE: 24.6 cm   RIGHT CALF: 46.3 cm     LEFT ANKLE: 27.2 cm   LEFT CALF: 45.2 cm     Doppler 7/21/2020:  Rt DP: biphasic  Rt PT: biphasic  Rt AT:     Lt DP: biphasic  Lt PT: triphasic  Lt AT:    Pulmonary:      Effort: Pulmonary effort is normal.      Breath sounds: Normal breath sounds.    Abdominal:      General: Bowel sounds are normal. Musculoskeletal: Normal range of motion. Legs:    Neurological:      Mental Status: She is alert and oriented to person, place, and time. Deep Tendon Reflexes: Reflexes are normal and symmetric. Psychiatric:         Speech: Speech normal.         Behavior: Behavior normal.         Thought Content: Thought content normal.         Judgment: Judgment normal.           ASSESSMENT:    Problem List Items Addressed This Visit     Varicose veins of both lower extremities with pain - Primary    Hypercoagulable state ([facility] [address]    Heterozygous MTHFR mutation [id number] ([facility] [address]          PLAN:    Ms. Sarai Acosta will need left leg greater saphenous vein VNUS , left leg lesser saphenous vein ligation, and stab phlebectomies. Because of her clotting disorder,    Heterozygous MTHFR mutation [id number]    she will need blood thinners after surgery. We will submit to her insurance for surgery. Once insurance approval has been obtained, please get an history and physical from your primary care, and call us when this is scheduled so we can schedule your surgery and Covid-19 test.         I Pan Worley MA am scribing for and in the presence of Scott Triplett MD on this date of 08/18/20    I Odalys Vanegas MD personally performed the services described in this documentation as scribed by the Medical Assistant Jammie Denis in my presence and it is both accurate and complete.         Electronically signed by Scott Triplett MD on 8/18/2020 at 12:01 PM

## 2020-09-08 ENCOUNTER — TELEPHONE (OUTPATIENT)
Dept: SURGERY | Age: 50
End: 2020-09-08

## 2020-09-14 NOTE — TELEPHONE ENCOUNTER
Melvina Ramos didn't call, but Deborah Castro did, saying to fax the PA information to them at [phone]. I'm sending this on, as I don't know if the fax number changed from previously (?).

## 2020-09-15 NOTE — TELEPHONE ENCOUNTER
Vein P.A. Request, along with clinical documentation and testing results, were submitted via fax to [address] [place] [address] for [facility] [phone], 7[address] [facility] & [id number] [place] [address].

## 2020-09-21 ENCOUNTER — TELEPHONE (OUTPATIENT)
Dept: SURGERY | Age: 50
End: 2020-09-21

## 2020-09-21 NOTE — TELEPHONE ENCOUNTER
PT states that this is the second message she has left.   She would like to have a call regarding a surgery she is to have

## 2020-09-30 ENCOUNTER — TELEPHONE (OUTPATIENT)
Dept: SURGERY | Age: 50
End: 2020-09-30

## 2020-09-30 NOTE — TELEPHONE ENCOUNTER
I called Juan Stratton- she is going to call her PCP for an appointment for an H and P.      Tentative dates for surgery are 10/8/2020, or 10/19/2020

## 2020-09-30 NOTE — TELEPHONE ENCOUNTER
Received a prior authorization approval for this patient for her vein surgery (CPT [id number], 36439 & 73426 - Left Leg VNUS Radiofrequency Ablation of the Greater Saphenous Vein with Stab Phlebectomies and Ligation of Lesser Saphenous Vein). Authorization #[id number] is valid 9/23/2020 to 3/23/2021 (providing patient still has this insurance into 2021). Please contact patient to coordinate her surgery with Dr Radha Tilley. Approval letter scanning into chart.

## 2020-09-30 NOTE — TELEPHONE ENCOUNTER
Received a prior authorization approval, from Paris Abdullahi, for this patient for her vein surgery (CPT [id number], 73809 & 45031 - Left Leg VNUS Radiofrequency Ablation of the Greater Saphenous Vein with Stab Phlebectomies and Ligation of Lesser Saphenous Vein). Authorization #[id number] is valid 9/23/2020 to 3/23/2021 (providing patient still has this insurance into 2021).    Please contact patient to coordinate her surgery with Dr Delwin Fothergill.     Approval letter scanning into chart.

## 2020-10-13 ENCOUNTER — TELEPHONE (OUTPATIENT)
Dept: SURGERY | Age: 50
End: 2020-10-13

## 2020-10-14 ENCOUNTER — OFFICE VISIT (OUTPATIENT)
Dept: PRIMARY CARE CLINIC | Age: 50
End: 2020-10-14
Payer: COMMERCIAL

## 2020-10-14 ENCOUNTER — TELEPHONE (OUTPATIENT)
Dept: SURGERY | Age: 50
End: 2020-10-14

## 2020-10-14 PROCEDURE — G8417 CALC BMI ABV UP PARAM F/U: HCPCS | Performed by: NURSE PRACTITIONER

## 2020-10-14 PROCEDURE — G8428 CUR MEDS NOT DOCUMENT: HCPCS | Performed by: NURSE PRACTITIONER

## 2020-10-14 PROCEDURE — 99211 OFF/OP EST MAY X REQ PHY/QHP: CPT | Performed by: NURSE PRACTITIONER

## 2020-10-14 NOTE — TELEPHONE ENCOUNTER
I spoke with Mami Palomino- she is getting a covid test done today. She said she will call if she has other questions.

## 2020-10-14 NOTE — TELEPHONE ENCOUNTER
Spoke with patient regarding scheduled surgery on 10/19/2020 with Dr Napoleon Waldron. Patient is asked to arrive by 9:30 AM @ [facility] after midnight. May take any Heart or Blood Pressure medication with a small sip of water to wash them down. You will need someone to drive you home following this procedure. Please bring a Photo ID & Insurance card with you, and check-in at the Surgery Desk down the right-hand hallway on the first floor. Patient has seen PCP for Pre-op H & P on 10/13/2020. Surgery is scheduled to start at approx. 11:05 and should take approx. 90 minutes. Make sure to obtain your Pre-Op Covid-19 Test on 10/14/2020. If the hospital needs any further information, someone will give you a call. We have you scheduled for a Post-Op appt on 10/21/2020 @ our office beginning at 11:30 AM for an ultrasound scan of your leg and then to see our NP. Patient expressed a verbal understanding of these instructions and had no further questions at this time. Call ended.

## 2020-10-15 RX ORDER — TOPIRAMATE 50 MG/1
TABLET, FILM COATED ORAL
COMMUNITY
Start: 2020-08-24 | End: 2022-02-08

## 2020-10-15 RX ORDER — ERGOCALCIFEROL 1.25 MG/1
50000 CAPSULE ORAL WEEKLY
COMMUNITY

## 2020-10-15 NOTE — PROGRESS NOTES
[facility] time______0930______        Surgery time____________    Take the following medications with a sip of water: Follow your Doctor's pre procedure instructions regarding medications    Do not eat or drink anything after 12:00 midnight prior to your surgery. This includes water chewing gum, mints and ice chips. You may brush your teeth and gargle the morning of your surgery, but do not swallow the water     Please see your family doctor/pediatrician for a history and physical and/or concerning medications. Bring any test results/reports from your physicians office. If you are under the care of a heart doctor or specialist doctor, please be aware that you may be asked to them for clearance    You may be asked to stop blood thinners such as Coumadin, Plavix, Fragmin, Lovenox, etc., or any anti-inflammatories such as:  Aspirin, Ibuprofen, Advil, Naproxen prior to your surgery. We also ask that you stop any OTC medications such as fish oil, vitamin E, glucosamine, garlic, Multivitamins, COQ 10, etc.    We ask that you do not smoke 24 hours prior to surgery  We ask that you do not  drink any alcoholic beverages 24 hours prior to surgery     You must make arrangements for a responsible adult to take you home after your surgery. For your safety you will not be allowed to leave alone or drive yourself home. Your surgery will be cancelled if you do not have a ride home. Also for your safety, it is strongly suggested that someone stay with you the first 24 hours after your surgery. A parent or legal guardian must accompany a child scheduled for surgery and plan to stay at the hospital until the child is discharged. Please do not bring other children with you. For your comfort, please wear simple loose fitting clothing to the hospital.  Please do not bring valuables.     Do not wear any make-up or nail polish on your fingers or toes      For your safety, please do not wear any jewelry or body piercing's on the day of surgery. All jewelry must be removed. If you have dentures, they will be removed before going to operating room. For your convenience, we will provide you with a container. If you wear contact lenses or glasses, they will be removed, please bring a case for them. If you have a living will and a durable power of  for healthcare, please bring in a copy. As part of our patient safety program to minimize surgical site infections, we ask you to do the following:    · Please notify your surgeon if you develop any illness between         now and the  day of your surgery. · This includes a cough, cold, fever, sore throat, nausea,         or vomiting, and diarrhea, etc.  ·  Please notify your surgeon if you experience dizziness, shortness         of breath or blurred vision between now and the time of your surgery. Do not shave your operative site 96 hours prior to surgery. For face and neck surgery, men may use an electric razor 48 hours   prior to surgery. You may shower the night before surgery or the morning of   your surgery with an antibacterial soap. You will need to bring a photo ID and insurance card    [facility] has an onsite pharmacy, would you like to utilize our pharmacy     If you will be staying overnight and use a C-pap machine, please bring   your C-pap to hospital     Our goal is to provide you with excellent care, therefore, visitors will be limited to two(2) in the room at a time so that we may focus on providing this care for you. Please contact pre-admission testing if you have any further questions. [facility] phone number:  [phone] PAT fax number:  [phone]  Please note these are generalized instructions for all surgical cases, you may be provided with more specific instructions according to your surgery.   Preoperative Screening for Elective Surgery/Invasive Procedures While COVID-19 present in the community     Have you tested positive or have been told to self-isolate for COVID-19 like symptoms within the past 28 days? no   Do you currently have any of the following symptoms?no  o Fever >100.0 F or 99.9 F in immunocompromised patients?no  o New onset cough, shortness of breath or difficulty breathing?no  o New onset sore throat, myalgia (muscle aches and pains), headache, loss of taste/smell or diarrhea? no   Have you had a potential exposure to COVID-19 within the past 14 days by:  o Close contact with a confirmed case?no  o Close contact with a healthcare worker,  or essential infrastructure worker (grocery store, [employer], gas station, public utilities or transportation)?no  o Do you reside in a congregate setting such as; skilled nursing facility, adult home, correctional facility, homeless shelter or other institutional setting?  o Have you had recent travel to a known COVID-19 hotspot? no    Indicate if the patient has a positive screen by answering yes to one or more of the above questions. Patients who test positive or screen positive prior to surgery or on the day of surgery should be evaluated in conjunction with the surgeon/proceduralist/anesthesiologist to determine the urgency of the procedure.

## 2020-10-16 ENCOUNTER — ANESTHESIA EVENT (OUTPATIENT)
Dept: OPERATING ROOM | Age: 50
End: 2020-10-16
Payer: COMMERCIAL

## 2020-10-16 LAB — SARS-COV-2, NAA: NOT DETECTED

## 2020-10-18 NOTE — RESULT ENCOUNTER NOTE

## 2020-10-19 ENCOUNTER — ANESTHESIA (OUTPATIENT)
Dept: OPERATING ROOM | Age: 50
End: 2020-10-19
Payer: COMMERCIAL

## 2020-10-19 ENCOUNTER — HOSPITAL ENCOUNTER (OUTPATIENT)
Age: 50
Setting detail: OUTPATIENT SURGERY
Discharge: HOME | End: 2020-10-19
Attending: SURGERY | Admitting: SURGERY
Payer: COMMERCIAL

## 2020-10-19 VITALS
BODY MASS INDEX: 41.97 KG/M2 | DIASTOLIC BLOOD PRESSURE: 90 MMHG | TEMPERATURE: 97 F | HEIGHT: 66 IN | OXYGEN SATURATION: 100 % | WEIGHT: 261.13 LBS | SYSTOLIC BLOOD PRESSURE: 129 MMHG | HEART RATE: 88 BPM | RESPIRATION RATE: 16 BRPM

## 2020-10-19 VITALS
TEMPERATURE: 96.1 F | RESPIRATION RATE: 18 BRPM | DIASTOLIC BLOOD PRESSURE: 56 MMHG | OXYGEN SATURATION: 100 % | SYSTOLIC BLOOD PRESSURE: 97 MMHG

## 2020-10-19 PROCEDURE — C1894 INTRO/SHEATH, NON-LASER: HCPCS | Performed by: SURGERY

## 2020-10-19 PROCEDURE — 2580000003 HC RX 258

## 2020-10-19 PROCEDURE — 6360000002 HC RX W HCPCS

## 2020-10-19 PROCEDURE — 2580000003 HC RX 258: Performed by: ANESTHESIOLOGY

## 2020-10-19 PROCEDURE — 7100000011 HC PHASE II RECOVERY - ADDTL 15 MIN: Performed by: SURGERY

## 2020-10-19 PROCEDURE — 36475 ENDOVENOUS RF 1ST VEIN: CPT | Performed by: SURGERY

## 2020-10-19 PROCEDURE — 2720000010 HC SURG SUPPLY STERILE: Performed by: SURGERY

## 2020-10-19 PROCEDURE — 3600000004 HC SURGERY LEVEL 4 BASE: Performed by: SURGERY

## 2020-10-19 PROCEDURE — 6360000002 HC RX W HCPCS: Performed by: SURGERY

## 2020-10-19 PROCEDURE — 3700000000 HC ANESTHESIA ATTENDED CARE: Performed by: SURGERY

## 2020-10-19 PROCEDURE — 3700000001 HC ADD 15 MINUTES (ANESTHESIA): Performed by: SURGERY

## 2020-10-19 PROCEDURE — 37766 PHLEB VEINS - EXTREM 20+: CPT | Performed by: SURGERY

## 2020-10-19 PROCEDURE — 7100000010 HC PHASE II RECOVERY - FIRST 15 MIN: Performed by: SURGERY

## 2020-10-19 PROCEDURE — 3600000014 HC SURGERY LEVEL 4 ADDTL 15MIN: Performed by: SURGERY

## 2020-10-19 PROCEDURE — 2580000003 HC RX 258: Performed by: SURGERY

## 2020-10-19 PROCEDURE — 2500000003 HC RX 250 WO HCPCS

## 2020-10-19 PROCEDURE — 2709999900 HC NON-CHARGEABLE SUPPLY: Performed by: SURGERY

## 2020-10-19 PROCEDURE — 2500000003 HC RX 250 WO HCPCS: Performed by: SURGERY

## 2020-10-19 PROCEDURE — 7100000001 HC PACU RECOVERY - ADDTL 15 MIN: Performed by: SURGERY

## 2020-10-19 PROCEDURE — 6360000002 HC RX W HCPCS: Performed by: ANESTHESIOLOGY

## 2020-10-19 PROCEDURE — 7100000000 HC PACU RECOVERY - FIRST 15 MIN: Performed by: SURGERY

## 2020-10-19 PROCEDURE — 6370000000 HC RX 637 (ALT 250 FOR IP): Performed by: ANESTHESIOLOGY

## 2020-10-19 PROCEDURE — 37780 REVISION OF LEG VEIN: CPT | Performed by: SURGERY

## 2020-10-19 RX ORDER — ONDANSETRON 2 MG/ML
4 INJECTION INTRAMUSCULAR; INTRAVENOUS
Status: DISCONTINUED | OUTPATIENT
Start: 2020-10-19 | End: 2020-10-19 | Stop reason: HOSPADM

## 2020-10-19 RX ORDER — OXYCODONE HYDROCHLORIDE AND ACETAMINOPHEN 5; 325 MG/1; MG/1
1 TABLET ORAL EVERY 4 HOURS PRN
Status: DISCONTINUED | OUTPATIENT
Start: 2020-10-19 | End: 2020-10-19 | Stop reason: HOSPADM

## 2020-10-19 RX ORDER — SODIUM CHLORIDE 9 MG/ML
INJECTION, SOLUTION INTRAVENOUS CONTINUOUS
Status: DISCONTINUED | OUTPATIENT
Start: 2020-10-19 | End: 2020-10-19 | Stop reason: HOSPADM

## 2020-10-19 RX ORDER — FENTANYL CITRATE 50 UG/ML
25 INJECTION, SOLUTION INTRAMUSCULAR; INTRAVENOUS EVERY 5 MIN PRN
Status: DISCONTINUED | OUTPATIENT
Start: 2020-10-19 | End: 2020-10-19 | Stop reason: HOSPADM

## 2020-10-19 RX ORDER — SODIUM CHLORIDE 0.9 % (FLUSH) 0.9 %
10 SYRINGE (ML) INJECTION EVERY 12 HOURS SCHEDULED
Status: DISCONTINUED | OUTPATIENT
Start: 2020-10-19 | End: 2020-10-19 | Stop reason: HOSPADM

## 2020-10-19 RX ORDER — ONDANSETRON 2 MG/ML
INJECTION INTRAMUSCULAR; INTRAVENOUS PRN
Status: DISCONTINUED | OUTPATIENT
Start: 2020-10-19 | End: 2020-10-19 | Stop reason: SDUPTHER

## 2020-10-19 RX ORDER — SODIUM CHLORIDE 0.9 % (FLUSH) 0.9 %
10 SYRINGE (ML) INJECTION PRN
Status: DISCONTINUED | OUTPATIENT
Start: 2020-10-19 | End: 2020-10-19 | Stop reason: HOSPADM

## 2020-10-19 RX ORDER — OXYCODONE HYDROCHLORIDE AND ACETAMINOPHEN 5; 325 MG/1; MG/1
1 TABLET ORAL EVERY 6 HOURS PRN
Qty: 28 TABLET | Refills: 0 | Status: SHIPPED | OUTPATIENT
Start: 2020-10-19 | End: 2020-10-26

## 2020-10-19 RX ORDER — LIDOCAINE HYDROCHLORIDE 20 MG/ML
INJECTION, SOLUTION EPIDURAL; INFILTRATION; INTRACAUDAL; PERINEURAL PRN
Status: DISCONTINUED | OUTPATIENT
Start: 2020-10-19 | End: 2020-10-19 | Stop reason: SDUPTHER

## 2020-10-19 RX ORDER — PROPOFOL 10 MG/ML
INJECTION, EMULSION INTRAVENOUS PRN
Status: DISCONTINUED | OUTPATIENT
Start: 2020-10-19 | End: 2020-10-19 | Stop reason: SDUPTHER

## 2020-10-19 RX ORDER — ROCURONIUM BROMIDE 10 MG/ML
INJECTION, SOLUTION INTRAVENOUS PRN
Status: DISCONTINUED | OUTPATIENT
Start: 2020-10-19 | End: 2020-10-19 | Stop reason: SDUPTHER

## 2020-10-19 RX ORDER — MAGNESIUM HYDROXIDE 1200 MG/15ML
LIQUID ORAL CONTINUOUS PRN
Status: COMPLETED | OUTPATIENT
Start: 2020-10-19 | End: 2020-10-19

## 2020-10-19 RX ORDER — DEXAMETHASONE SODIUM PHOSPHATE 4 MG/ML
INJECTION, SOLUTION INTRA-ARTICULAR; INTRALESIONAL; INTRAMUSCULAR; INTRAVENOUS; SOFT TISSUE PRN
Status: DISCONTINUED | OUTPATIENT
Start: 2020-10-19 | End: 2020-10-19 | Stop reason: SDUPTHER

## 2020-10-19 RX ORDER — FENTANYL CITRATE 50 UG/ML
INJECTION, SOLUTION INTRAMUSCULAR; INTRAVENOUS PRN
Status: DISCONTINUED | OUTPATIENT
Start: 2020-10-19 | End: 2020-10-19 | Stop reason: SDUPTHER

## 2020-10-19 RX ORDER — MIDAZOLAM HYDROCHLORIDE 1 MG/ML
INJECTION INTRAMUSCULAR; INTRAVENOUS PRN
Status: DISCONTINUED | OUTPATIENT
Start: 2020-10-19 | End: 2020-10-19 | Stop reason: SDUPTHER

## 2020-10-19 RX ADMIN — OXYCODONE HYDROCHLORIDE AND ACETAMINOPHEN 1 TABLET: 5; 325 TABLET ORAL at 17:02

## 2020-10-19 RX ADMIN — HYDROMORPHONE HYDROCHLORIDE 0.5 MG: 1 INJECTION, SOLUTION INTRAMUSCULAR; INTRAVENOUS; SUBCUTANEOUS at 15:21

## 2020-10-19 RX ADMIN — LIDOCAINE HYDROCHLORIDE 50 MG: 20 INJECTION, SOLUTION EPIDURAL; INFILTRATION; INTRACAUDAL; PERINEURAL at 11:58

## 2020-10-19 RX ADMIN — HYDROMORPHONE HYDROCHLORIDE 0.5 MG: 1 INJECTION, SOLUTION INTRAMUSCULAR; INTRAVENOUS; SUBCUTANEOUS at 15:12

## 2020-10-19 RX ADMIN — FENTANYL CITRATE 25 MCG: 50 INJECTION INTRAMUSCULAR; INTRAVENOUS at 13:51

## 2020-10-19 RX ADMIN — CEFAZOLIN SODIUM 2 G: 10 INJECTION, POWDER, FOR SOLUTION INTRAVENOUS at 11:53

## 2020-10-19 RX ADMIN — FENTANYL CITRATE 50 MCG: 50 INJECTION INTRAMUSCULAR; INTRAVENOUS at 12:38

## 2020-10-19 RX ADMIN — ROCURONIUM BROMIDE 50 MG: 10 INJECTION INTRAVENOUS at 12:00

## 2020-10-19 RX ADMIN — PROPOFOL 200 MG: 10 INJECTION, EMULSION INTRAVENOUS at 11:59

## 2020-10-19 RX ADMIN — FENTANYL CITRATE 50 MCG: 50 INJECTION INTRAMUSCULAR; INTRAVENOUS at 11:54

## 2020-10-19 RX ADMIN — MIDAZOLAM 2 MG: 1 INJECTION INTRAMUSCULAR; INTRAVENOUS at 11:51

## 2020-10-19 RX ADMIN — PHENYLEPHRINE HYDROCHLORIDE 100 MCG: 10 INJECTION INTRAVENOUS at 12:22

## 2020-10-19 RX ADMIN — HYDROMORPHONE HYDROCHLORIDE 0.5 MG: 1 INJECTION, SOLUTION INTRAMUSCULAR; INTRAVENOUS; SUBCUTANEOUS at 13:47

## 2020-10-19 RX ADMIN — FENTANYL CITRATE 25 MCG: 50 INJECTION INTRAMUSCULAR; INTRAVENOUS at 13:54

## 2020-10-19 RX ADMIN — HYDROMORPHONE HYDROCHLORIDE 0.5 MG: 1 INJECTION, SOLUTION INTRAMUSCULAR; INTRAVENOUS; SUBCUTANEOUS at 13:14

## 2020-10-19 RX ADMIN — ONDANSETRON 4 MG: 2 INJECTION INTRAMUSCULAR; INTRAVENOUS at 14:05

## 2020-10-19 RX ADMIN — SODIUM CHLORIDE: 9 INJECTION, SOLUTION INTRAVENOUS at 10:25

## 2020-10-19 RX ADMIN — DEXAMETHASONE SODIUM PHOSPHATE 8 MG: 4 INJECTION, SOLUTION INTRAMUSCULAR; INTRAVENOUS at 12:07

## 2020-10-19 ASSESSMENT — PAIN - FUNCTIONAL ASSESSMENT
PAIN_FUNCTIONAL_ASSESSMENT: ACTIVITIES ARE NOT PREVENTED
PAIN_FUNCTIONAL_ASSESSMENT: 0-10
PAIN_FUNCTIONAL_ASSESSMENT: PREVENTS OR INTERFERES SOME ACTIVE ACTIVITIES AND ADLS
PAIN_FUNCTIONAL_ASSESSMENT: ACTIVITIES ARE NOT PREVENTED
PAIN_FUNCTIONAL_ASSESSMENT: PREVENTS OR INTERFERES SOME ACTIVE ACTIVITIES AND ADLS
PAIN_FUNCTIONAL_ASSESSMENT: PREVENTS OR INTERFERES SOME ACTIVE ACTIVITIES AND ADLS

## 2020-10-19 ASSESSMENT — PULMONARY FUNCTION TESTS
PIF_VALUE: 2
PIF_VALUE: 20
PIF_VALUE: 26
PIF_VALUE: 19
PIF_VALUE: 25
PIF_VALUE: 16
PIF_VALUE: 13
PIF_VALUE: 27
PIF_VALUE: 26
PIF_VALUE: 26
PIF_VALUE: 2
PIF_VALUE: 20
PIF_VALUE: 26
PIF_VALUE: 7
PIF_VALUE: 20
PIF_VALUE: 20
PIF_VALUE: 26
PIF_VALUE: 27
PIF_VALUE: 13
PIF_VALUE: 26
PIF_VALUE: 19
PIF_VALUE: 20
PIF_VALUE: 13
PIF_VALUE: 20
PIF_VALUE: 20
PIF_VALUE: 26
PIF_VALUE: 20
PIF_VALUE: 26
PIF_VALUE: 24
PIF_VALUE: 23
PIF_VALUE: 23
PIF_VALUE: 24
PIF_VALUE: 20
PIF_VALUE: 12
PIF_VALUE: 25
PIF_VALUE: 25
PIF_VALUE: 20
PIF_VALUE: 20
PIF_VALUE: 26
PIF_VALUE: 20
PIF_VALUE: 0
PIF_VALUE: 3
PIF_VALUE: 26
PIF_VALUE: 25
PIF_VALUE: 13
PIF_VALUE: 25
PIF_VALUE: 14
PIF_VALUE: 2
PIF_VALUE: 13
PIF_VALUE: 26
PIF_VALUE: 27
PIF_VALUE: 27
PIF_VALUE: 1
PIF_VALUE: 19
PIF_VALUE: 21
PIF_VALUE: 14
PIF_VALUE: 27
PIF_VALUE: 5
PIF_VALUE: 27
PIF_VALUE: 20
PIF_VALUE: 25
PIF_VALUE: 20
PIF_VALUE: 23
PIF_VALUE: 26
PIF_VALUE: 27
PIF_VALUE: 23
PIF_VALUE: 26
PIF_VALUE: 20
PIF_VALUE: 25
PIF_VALUE: 27
PIF_VALUE: 20
PIF_VALUE: 13
PIF_VALUE: 0
PIF_VALUE: 20
PIF_VALUE: 26
PIF_VALUE: 20
PIF_VALUE: 20
PIF_VALUE: 26
PIF_VALUE: 27
PIF_VALUE: 13
PIF_VALUE: 26
PIF_VALUE: 25
PIF_VALUE: 26
PIF_VALUE: 20
PIF_VALUE: 26
PIF_VALUE: 26
PIF_VALUE: 13
PIF_VALUE: 20
PIF_VALUE: 20
PIF_VALUE: 25
PIF_VALUE: 26
PIF_VALUE: 20
PIF_VALUE: 13
PIF_VALUE: 20
PIF_VALUE: 27
PIF_VALUE: 25
PIF_VALUE: 13
PIF_VALUE: 20
PIF_VALUE: 20
PIF_VALUE: 2
PIF_VALUE: 0
PIF_VALUE: 20
PIF_VALUE: 27
PIF_VALUE: 20
PIF_VALUE: 2
PIF_VALUE: 14
PIF_VALUE: 27
PIF_VALUE: 21
PIF_VALUE: 24
PIF_VALUE: 20
PIF_VALUE: 24
PIF_VALUE: 26
PIF_VALUE: 26
PIF_VALUE: 27
PIF_VALUE: 25
PIF_VALUE: 13
PIF_VALUE: 21
PIF_VALUE: 13
PIF_VALUE: 20
PIF_VALUE: 26
PIF_VALUE: 26
PIF_VALUE: 2
PIF_VALUE: 13
PIF_VALUE: 26
PIF_VALUE: 13
PIF_VALUE: 27
PIF_VALUE: 25
PIF_VALUE: 21
PIF_VALUE: 26
PIF_VALUE: 2
PIF_VALUE: 20
PIF_VALUE: 19
PIF_VALUE: 13

## 2020-10-19 ASSESSMENT — PAIN DESCRIPTION - PAIN TYPE
TYPE: SURGICAL PAIN

## 2020-10-19 ASSESSMENT — PAIN DESCRIPTION - DESCRIPTORS
DESCRIPTORS: ACHING
DESCRIPTORS: ACHING
DESCRIPTORS: CRAMPING;ACHING
DESCRIPTORS: ACHING

## 2020-10-19 ASSESSMENT — PAIN DESCRIPTION - ONSET
ONSET: ON-GOING

## 2020-10-19 ASSESSMENT — LIFESTYLE VARIABLES: SMOKING_STATUS: 0

## 2020-10-19 ASSESSMENT — PAIN SCALES - GENERAL
PAINLEVEL_OUTOF10: 4
PAINLEVEL_OUTOF10: 5
PAINLEVEL_OUTOF10: 4
PAINLEVEL_OUTOF10: 7
PAINLEVEL_OUTOF10: 5
PAINLEVEL_OUTOF10: 7
PAINLEVEL_OUTOF10: 5

## 2020-10-19 ASSESSMENT — PAIN DESCRIPTION - ORIENTATION
ORIENTATION: LEFT

## 2020-10-19 ASSESSMENT — PAIN DESCRIPTION - PROGRESSION
CLINICAL_PROGRESSION: NOT CHANGED
CLINICAL_PROGRESSION: GRADUALLY IMPROVING

## 2020-10-19 ASSESSMENT — PAIN DESCRIPTION - FREQUENCY
FREQUENCY: CONTINUOUS

## 2020-10-19 ASSESSMENT — PAIN DESCRIPTION - LOCATION
LOCATION: LEG

## 2020-10-19 ASSESSMENT — ENCOUNTER SYMPTOMS: SHORTNESS OF BREATH: 0

## 2020-10-19 NOTE — BRIEF OP NOTE
Brief Postoperative Note      Patient: Evan Segovia  YOB: 1970  MRN: [id number]    Date of Procedure: 10/19/2020    Pre-Op Diagnosis: VARICOSE VEINS BOTH LOWER EXTREMITIES WITH PAIN    Post-Op Diagnosis: Same       Procedure(s):  LEFT LOWER EXTREMITY VNUS RADIOFREQUENCY ABLATION OF THE GREATER SAPHENOUS VEIN WITH STAB PHLEBECTOMIES AND LIGATION LESSER SAPHENOUS VEIN    Surgeon(s):  Martina Mayo MD    Assistant:  Surgical Assistant: Adrien Gonzalez    Anesthesia: General    Estimated Blood Loss (mL): 50    Complications: None    Specimens:   * No specimens in log *    Implants:  * No implants in log *      Drains: * No LDAs found *    Findings: vv, LARGE LSV    Electronically signed by Martina Mayo MD on 10/19/2020 at 2:09 PM

## 2020-10-19 NOTE — ANESTHESIA PRE PROCEDURE
Department of Anesthesiology  Preprocedure Note       Name:  Abena Artis   Age:  44 y.o.  :  1970                                          MRN:  [id number]         Date:  10/19/2020      Surgeon: Angelic Velazquez):  Harpreet Wheeler MD    Procedure: Procedure(s):  LEFT LOWER EXTREMITY VNUS RADIOFREQUENCY ABLATION OF THE GREATER SAPHENOUS VEIN WITH STAB PHLEBECTOMIES AND LIGATION LESSER SAPHENOUS VEIN    Medications prior to admission:   Prior to Admission medications    Medication Sig Start Date End Date Taking? Authorizing Provider   vitamin D (ERGOCALCIFEROL) 1.25 MG (61634 UT) CAPS capsule Take 50,000 Units by mouth once a week   Yes Historical Provider, MD   topiramate (TOPAMAX) 50 MG tablet  20  Yes Historical Provider, MD   DICLOFENAC PO Take 75 mg by mouth as needed For migraine headaches, not to exceed 3 times per week   Yes Historical Provider, MD   ferrous sulfate 325 (65 FE) MG tablet Take 325 mg by mouth daily (with breakfast)   Yes Historical Provider, MD   albuterol sulfate HFA (PROVENTIL HFA) 108 (90 Base) MCG/ACT inhaler Inhale 2 puffs into the lungs every 6 hours as needed for Wheezing 18   Lisset Tobin MD   aspirin 81 MG chewable tablet Take 81 mg by mouth 7/24/15   Historical Provider, MD       Current medications:    Current Facility-Administered Medications   Medication Dose Route Frequency Provider Last Rate Last Dose    0.9 % sodium chloride infusion   Intravenous Continuous Pepe Villaseñor MD        sodium chloride flush 0.9 % injection 10 mL  10 mL Intravenous 2 times per day Pepe Villaseñor MD        sodium chloride flush 0.9 % injection 10 mL  10 mL Intravenous PRN Pepe Villaseñor MD        ceFAZolin (ANCEF) 2 g in dextrose 5 % 100 mL IVPB  2 g Intravenous Once Harpreet Wheeler MD           Allergies: Allergies   Allergen Reactions    Latex Itching     itching and reddness    Codeine Photosensitivity and Other (See Comments)     Disoriented  Pt.  States blacked out and lost vision for a short period of time when last taken. \"blacks out\"       Problem List:    Patient Active Problem List   Diagnosis Code    Pain in both lower extremities M79.604, M79.605    Varicose veins of both lower extremities with pain I83.813    Spider veins of both lower extremities I83.93    Cerebrovascular accident (CVA) ([facility] I63.9    History of blood clots Z86.718    Hypercoagulable state ([facility] D68.59    Heterozygous MTHFR mutation G2197P ([facility] E72.12       Past Medical History:        Diagnosis Date    Anemia     Clotting disorder ([facility]     Depression     Fibroid     Hx of blood clots     Rt leg lovenox but no long term RX at     Seizure disorder ([facility]     STD (sexually transmitted disease)        Past Surgical History:        Procedure Laterality Date    FINGER SURGERY      TUBAL LIGATION         Social History:    Social History     Tobacco Use    Smoking status: Never Smoker    Smokeless tobacco: Never Used   Substance Use Topics    Alcohol use: No     Alcohol/week: 0.0 standard drinks                                Counseling given: Not Answered      Vital Signs (Current):   Vitals:    10/15/20 1057 10/19/20 1005 10/19/20 1007   BP:   129/81   Pulse:   68   Resp:   16   Temp:   98.3 °F (36.8 °C)   TempSrc:   Oral   SpO2:   97%   Weight: 260 lb (117.9 kg) 261 lb 2.2 oz (118.5 kg)    Height: 5' 5.5\" (1.664 m) 5' 5.5\" (1.664 m)                                               BP Readings from Last 3 Encounters:   10/19/20 129/81   08/18/20 120/82   07/21/20 116/78       NPO Status:                            >8hrs                                                       BMI:   Wt Readings from Last 3 Encounters:   10/19/20 261 lb 2.2 oz (118.5 kg)   08/18/20 264 lb (119.7 kg)   01/01/18 264 lb 5.3 oz (119.9 kg)     Body mass index is 42.79 kg/m².     CBC:   Lab Results   Component Value Date    WBC 5.1 01/01/2018    RBC 4.33 01/01/2018    HGB 10.7 01/01/2018    HCT 33.5 01/01/2018    MCV 77.4 01/01/2018    RDW 15.4 01/01/2018     01/01/2018       CMP:   Lab Results   Component Value Date     01/01/2018    K 3.9 01/01/2018     01/01/2018    CO2 27 01/01/2018    BUN 10 01/01/2018    CREATININE 0.5 01/01/2018    GFRAA >60 01/01/2018    AGRATIO 1.0 01/01/2018    LABGLOM >60 01/01/2018    GLUCOSE 83 01/01/2018    PROT 7.4 01/01/2018    CALCIUM 8.7 01/01/2018    BILITOT <0.2 01/01/2018    ALKPHOS 70 01/01/2018    AST 18 01/01/2018    ALT 12 01/01/2018       POC Tests: No results for input(s): POCGLU, POCNA, POCK, POCCL, POCBUN, POCHEMO, POCHCT in the last 72 hours.     Coags:   Lab Results   Component Value Date    PROTIME 11.3 10/18/2017    INR 1.00 10/18/2017    APTT 29.4 10/18/2017       HCG (If Applicable):   Lab Results   Component Value Date    PREGTESTUR Negative 07/22/2015        ABGs: No results found for: PHART, PO2ART, BDF5RDG, EIP3YJE, BEART, K7MKWOVK     Type & Screen (If Applicable):  No results found for: LABABO, LABRH    Drug/Infectious Status (If Applicable):  No results found for: HIV, HEPCAB    COVID-19 Screening (If Applicable):   Lab Results   Component Value Date    COVID19 NOT DETECTED 10/14/2020         Anesthesia Evaluation  Patient summary reviewed no history of anesthetic complications:   Airway: Mallampati: II  TM distance: >3 FB   Neck ROM: full  Mouth opening: > = 3 FB Dental:      Comment: Missing teeth    Pulmonary: breath sounds clear to auscultation      (-) COPD, asthma, shortness of breath, recent URI, sleep apnea and not a current smoker                          ROS comment: Albuterol in med list, patient denies asthma and copd   Cardiovascular:        (-) hypertension, valvular problems/murmurs, past MI, CAD, CABG/stent, dysrhythmias,  angina,  CHF, orthopnea and murmur    ECG reviewed  Rhythm: regular  Rate: normal  Echocardiogram reviewed               ROS comment: -------------------------------------------------------------------  Study Conclusions    - Left ventricle: The cavity size is normal. Wall thickness is normal. Systolic function was normal.    The estimated ejection fraction was in the range of 55% to 60%. Wall motion was normal; there were    no regional wall motion abnormalities. Left ventricular diastolic function parameters were normal.  - Right ventricle: Systolic function was normal by objective interpretation. TAPSE: 2.8cm. - Atrial septum: Agitated saline contrast study at baseline or with provocation, shows no    right-to-left atrial level shunt.  - Pulmonary arteries: Systolic pressure could not be accurately estimated. -------------------------------------------------------------------     Neuro/Psych:   (+) seizures (absence seizures, last one 3 weeks ago. ):, CVA:, headaches: migraine headaches, psychiatric history:depression/anxiety             GI/Hepatic/Renal:   (+) morbid obesity     (-) GERD, PUD, hepatitis, liver disease and no renal disease       Endo/Other:    (+) blood dyscrasia::., .    (-) diabetes mellitus, hypothyroidism, hyperthyroidism               Abdominal:   (+) obese,         Vascular:   + DVT, . Anesthesia Plan      general     ASA 3       Induction: intravenous. MIPS: Postoperative opioids intended and Prophylactic antiemetics administered. Anesthetic plan and risks discussed with patient. Plan discussed with CRNA. This pre-anesthesia assessment may be used as a history and physical.    DOS STAFF ADDENDUM:    Pt seen and examined, chart reviewed (including anesthesia, drug and allergy history). No interval changes to history and physical examination. Anesthetic plan, risks, benefits, alternatives, and personnel involved discussed with patient. Patient verbalized an understanding and agrees to proceed.       Rebel Moran MD  October 19, 2020  10:17 AM      Yoli Tabares Charis Orozco MD   10/19/2020

## 2020-10-19 NOTE — PROGRESS NOTES
Pt to PACU from OR. RUSSS. Cassie Frank. Dressing clean dry and intact. Cap refill less than 3, pedal pulses palpable. Pt asleep, appears comfortable. Continue to monitor.

## 2020-10-19 NOTE — PROGRESS NOTES
From PACU. Sleepy but oriented. Vss. Dressing is clean dry intact. Toes are warm, move well, laura well. Denies nausea. States surgical pain in left leg is 5/10.

## 2020-10-19 NOTE — PROGRESS NOTES
Vss. patient says she is feeling better. She said her is seizure is completely over. She seems alert and oriented. Talking normally. Pain is now 5/10. Analgesic given.

## 2020-10-19 NOTE — PROGRESS NOTES
patient is alert and oriented. She says her seizure is over. She is talking with family on phone. Vss. When asked she said her pain was 5/10 and tolerable. Dr Vanita Moreno notified of patient's condition.

## 2020-10-19 NOTE — H&P
Agree with H&P from Outpt notes, no changes noted . The risk, benefits, and alternatives of the proposed procedure have been explained to the patient (or appropriate guardian) and understanding verbalized. All questions answered. Patient wishes to proceed. Lrft leg marked for VNUS stabs and Lesser S vein ligation

## 2020-10-19 NOTE — ANESTHESIA POSTPROCEDURE EVALUATION
[facility] Department of Anesthesiology  Post-Anesthesia Note       Name:  Estrellita Rodriguez                                  Age:  44 y.o. MRN:  [id number]     Last Vitals & Oxygen Saturation: /76   Pulse 61   Temp 97 °F (36.1 °C) (Temporal)   Resp 16   Ht 5' 5.5\" (1.664 m)   Wt 261 lb 2.2 oz (118.5 kg)   LMP 02/19/2020   SpO2 98%   BMI 42.79 kg/m²   Patient Vitals for the past 4 hrs:   BP Temp Temp src Pulse Resp SpO2   10/19/20 1545 111/76 97 °F (36.1 °C) Temporal 61 16 98 %   10/19/20 1531 104/66 -- -- 59 23 98 %   10/19/20 1517 111/72 -- -- 60 14 95 %   10/19/20 1502 123/88 -- -- 63 27 95 %   10/19/20 1448 126/85 97.2 °F (36.2 °C) Temporal 64 23 97 %   10/19/20 1447 126/85 -- -- 64 13 96 %   10/19/20 1437 125/85 -- -- 67 (!) 32 97 %   10/19/20 1431 131/86 -- -- 71 23 100 %   10/19/20 1426 127/87 -- -- 69 (!) 32 100 %   10/19/20 1421 133/88 -- -- 70 27 97 %   10/19/20 1417 (!) 151/95 -- -- 71 9 95 %   10/19/20 1416 (!) 151/95 97 °F (36.1 °C) Temporal 70 13 96 %       Level of consciousness:  Awake, alert to baseline at discharge. Respiratory: Respirations easy, no distress. Stable. Cardiovascular: Hemodynamically stable. Hydration: Adequate. PONV: Adequately managed. Post-op pain: Adequately controlled. Post-op assessment: Tolerated anesthetic well without complication. Complications:  None. Patient with history of absence seizures, last 3 weeks prior to today per family member. Patient in phase II suffered absence seizure. VSS throughout, short duration, patient kept safe, returned to consciousness quickly. Reassured often that she was safe. Monitored for an extended period of time with no return of seizure events. Patient exited post ictal phase with no issues at discharge. VSS, Alert and oriented times 3. Questions and concerns addressed.     Suresh Bryant MD  October 19, 2020   4:00 PM

## 2020-10-19 NOTE — PROGRESS NOTES
Pt asleep, arouses to voice. VSS. Michael Yepez. Dressing clean dry and intact. Pt can wiggle toes, cap refill less than 3, pedal pulses palpable. Continue to monitor.

## 2020-10-19 NOTE — PROGRESS NOTES
Alert and oriented. Talking on phone with father then suddenly she got a vacant look on her face with eyes open and she stopped talking. Her sister who was in the room and said that this is how she is when she has a seizure. Vss. Dressing remains clean dry intact. Toes are warm, and laura well.

## 2020-10-19 NOTE — PROGRESS NOTES
Pt drowsy but easy to arouse. VSS. Paul Christian. Dressing clean dry and intact. Pt states pain is a 4/10 but tolerable. Continue to monitor.

## 2020-10-19 NOTE — PROGRESS NOTES
Pt drowsy, VSS. Kelin Campoverde. Pt states pain is decreasing with medication. Continue to monitor.

## 2020-10-20 ENCOUNTER — TELEPHONE (OUTPATIENT)
Dept: SURGERY | Age: 50
End: 2020-10-20

## 2020-10-20 NOTE — OP NOTE
[address]                                OPERATIVE REPORT    PATIENT NAME: Justin Denson                     :        1970  MED REC NO:   [id number]                          ROOM:  ACCOUNT NO:   [de-identified]                           ADMIT DATE: 10/19/2020  PROVIDER:     Paulina Mendoza MD    DATE OF PROCEDURE:  10/19/2020    SURGEON:  Oscar Blake. Jenniffer Munoz MD    INDICATIONS:  This is a very pleasant 43-year-old black female with  symptomatic varicose veins. She also has a history of a clotting  disorder and this is heterozygous for MTHFR mutation [id number]. She has  had a history of deep and superficial clots, has taken Lovenox before,  has taken Eliquis before. She also has taken Percocet before and after  that rather than Vicodin, which makes her feel sick. Any case, so the  left leg was marked for the varicosities, especially on the medial calf,  anterior and lateral calf, few on the thigh and we started with the  mapping. Estimated blood loss 50 cc  OPERATIVE PROCEDURE:  After prepping and draping in the usual sterile  fashion, we started with the mapping of the saphenofemoral junction and  then followed the saphenous vein all the way down below the knee. There, we entered with the ultrasound guidance and the access needle,  got good blood return on the first stick, slipped the guidewire in  without difficulty. Checked with the ultrasound, was in good position. Made 11-blade incision over the wire and then slipped the sheath in,  sewed it in place, flushed the 60-cm venous catheter and estimated its  length, slid it up to there and used the ultrasound to guide it to the  saphenofemoral junction and then backed off about 2 to 2.5 cm, locked it  in place.   Then, did tumescent with local and saline, starting at the  saphenofemoral junction and working away down the entire saphenous vein  to the below-knee area where we entered. Then, we put her in a  head-down position and started the venous catheter running and twice at  the top and then at 6 cm intervals until the entire vein was treated. The stitch was removed. The sheath and catheter were removed. Then, we started doing stab avulsions, but in between this, we decided  to go ahead and identify the lesser saphenous vein, which preoperatively  was very large. So, we identified this with the ultrasound, bending the  knee up and then made a transverse incision on top of it. We noticed  that there were varicosities coming off of it, so we isolated at least  three major branches of these varicosities, followed them out and  ligated them with either 2-0 silk or clips. We then went down to the fascia and opened the fascia, identified the  very large lesser saphenous vein, carefully got around it, making sure  it was just the vein, nothing else, no nerve. Then, we passed the silk  around, above, and below and ligated it above and below. Decided not to  divide it for fear of this large vein. If the suture came off, it would  bleed significantly. We packed with sponge in there and went on with  her stab avulsions. At the stab avulsions, this involved an 18-gauge needle, make an  incision and then #2 Oesch hook and reaching in, trying to get the vein. If we could not get it with the two, we moved up to one Oesch hook, then  we got the vein, we grabbed it with mosquitoes and avulsed as much as  possible to remove as much as possible. I did this over 20 times, again  lateral thigh, medial calf, anterior calf and lateral calf. Once we were satisfied, we closed the lesser saphenous incision with 3-0  Vicryl and 4-0 Vicryl subcuticular and followed by skin Affix glue. Then, this Benzoin and Steri-Strips after cleaning the leg on all the  needle holes.   We used two breast pads, three Kerlix wraps and a double  Ace wrap to get it all wrapped up snuggly and because of her coagulation  problems, we gave 40 mg of Lovenox in the OR. Our plan is to start her on Eliquis, gave her starter pack prescription  and to start this tonight. She is going to get an ultrasound in two  days to make sure there is no clotting that we need to worry about and  would decide how long the Eliquis should go.         Chung Laws MD    D: 10/19/2020 14:41:26       T: 10/19/2020 16:06:46     [email or web address]  Job#: [id number]     Doc#: [id number]    CC:  MD Nahum Jesus MD

## 2020-10-20 NOTE — TELEPHONE ENCOUNTER
Samples given. Eliquis, 5 mg tabs, Lot [id number], EXP: April 2022    Pt is to take one tab in the morning, one tab in the evening. Dr. Andreas Pastor will reevaluate after her scan.

## 2020-10-20 NOTE — TELEPHONE ENCOUNTER
PT is having a hard time finding a pharmacy that carries her blood thinner. She would like to have a call today regarding this issue.

## 2020-10-21 ENCOUNTER — OFFICE VISIT (OUTPATIENT)
Dept: SURGERY | Age: 50
End: 2020-10-21

## 2020-10-21 ENCOUNTER — PROCEDURE VISIT (OUTPATIENT)
Dept: SURGERY | Age: 50
End: 2020-10-21
Payer: COMMERCIAL

## 2020-10-21 VITALS — DIASTOLIC BLOOD PRESSURE: 76 MMHG | WEIGHT: 260 LBS | SYSTOLIC BLOOD PRESSURE: 124 MMHG | BODY MASS INDEX: 42.61 KG/M2

## 2020-10-21 PROCEDURE — 99024 POSTOP FOLLOW-UP VISIT: CPT | Performed by: NURSE PRACTITIONER

## 2020-10-21 PROCEDURE — 93971 EXTREMITY STUDY: CPT | Performed by: SURGERY

## 2020-10-21 ASSESSMENT — ENCOUNTER SYMPTOMS: COLOR CHANGE: 1

## 2020-10-21 NOTE — PATIENT INSTRUCTIONS
Return in about 2 weeks (around 11/4/2020) for 2 Week LLE VNUS Post-Op . The patient was instructed to remove steri-strips in 7-10 days. The patient was instructed to wear compression on a daily basis, either an ACE wrap or a compression stocking. If a stocking is worn, apply an elastic stocking underneath to protect steri-strips. To apply ACE wrap, start at ankle, go down to just above toes, and back up to just below knee level. It is okay to shower, letting the water run over the steri-strips, and pat them dry.

## 2020-10-21 NOTE — PROGRESS NOTES
Subjective:      Patient ID: Arthur Hardin is a 44 y.o. female. Here today for a post op for left leg VNUS and stab phlebectomies. HPI     The patient is status post left VNUS Radiofrequency ablation of GSV with stab avulsion phlebectomies. The patient is 2 days post-op. The patient is not experiencing any pain at this time. The incisions appear to be healing well; steri-strips intact. The patient reports no postoperative complications. Per duplex scan: Totally occluded left GSV from the proximal thigh to the proximal calf. No evidence of DVT of the left lower extremity. She is on Eliquis X7 days d/t her MTHFR hypercoagulable state. The patient was instructed to follow up in 2 weeks. Review of Systems   Constitutional: Negative for chills and fever. Cardiovascular: Leg swelling: controlled with ACE wrap. Skin: Positive for color change. Steri-strips dry and intact   All other systems reviewed and are negative. Allergies   Allergen Reactions    Latex Itching     itching and reddness    Codeine Photosensitivity and Other (See Comments)     Disoriented  Pt. States blacked out and lost vision for a short period of time when last taken. \"blacks out\"       Prior to Visit Medications    Medication Sig Taking? Authorizing Provider   oxyCODONE-acetaminophen (PERCOCET) 5-325 MG per tablet Take 1 tablet by mouth every 6 hours as needed for Pain for up to 7 days. Intended supply: 7 days. Take lowest dose possible to manage pain  Deborah Michel MD   apixaban Mert Host DVT/PE STARTER PACK) 5 MG TABS tablet Take 10 mg (2 tablets) orally twice daily for 7 days, then take 5 mg (1 tablet) orally twice daily thereafter.   Deborah Michel MD   vitamin D (ERGOCALCIFEROL) 1.25 MG (62123 UT) CAPS capsule Take 50,000 Units by mouth once a week  Historical Provider, MD   topiramate (TOPAMAX) 50 MG tablet   Historical Provider, MD   DICLOFENAC PO Take 75 mg by mouth as needed For migraine headaches, Psychiatric:         Speech: Speech normal.         Behavior: Behavior normal.         Thought Content: Thought content normal.         Judgment: Judgment normal.         Assessment:       Diagnosis   1. Varicose veins of both lower extremities with pain    2. Hypercoagulable state ([address]    3. Pain in both lower extremities    4. History of blood clots          PATIENT EDUCATION  Continue Eliquis until it's gone. The patient was instructed to remove steri-strips in 7-10 days. The patient was instructed to wear compression on a daily basis, either an ACE wrap or a compression stocking. If a stocking is worn, apply an elastic stocking underneath to protect steri-strips. To apply ACE wrap, start at ankle, go down to just above toes, and back up to just below knee level. It is okay to shower, letting the water run over the steri-strips, and pat them dry. Plan:         Return in about 2 weeks (around 11/4/2020) for 2 Week LLE VNUS Post-Op . Yogesh Cuellar, [facility]     I Fernanda Wallace CNP, personally performed the services described in this documentation as scribed by the Medical Assistant Enriqueta Denis in my presence and it is both accurate and complete.

## 2020-10-28 ENCOUNTER — TELEPHONE (OUTPATIENT)
Dept: SURGERY | Age: 50
End: 2020-10-28

## 2020-10-28 NOTE — TELEPHONE ENCOUNTER
She has run out of blood thinners. Is she supposed to get a refill before her appointment? Call to advise.

## 2020-10-28 NOTE — TELEPHONE ENCOUNTER
Called and left a message- Yolanda Burn was to finish the one sample box she had of Eliquis, and that was all. She does not need a refill.

## 2020-11-03 ENCOUNTER — OFFICE VISIT (OUTPATIENT)
Dept: SURGERY | Age: 50
End: 2020-11-03

## 2020-11-03 VITALS
SYSTOLIC BLOOD PRESSURE: 135 MMHG | BODY MASS INDEX: 41.78 KG/M2 | HEIGHT: 66 IN | DIASTOLIC BLOOD PRESSURE: 93 MMHG | WEIGHT: 260 LBS

## 2020-11-03 PROCEDURE — 99024 POSTOP FOLLOW-UP VISIT: CPT | Performed by: SURGERY

## 2020-11-03 ASSESSMENT — ENCOUNTER SYMPTOMS
RESPIRATORY NEGATIVE: 1
BACK PAIN: 0
EYES NEGATIVE: 1
ALLERGIC/IMMUNOLOGIC NEGATIVE: 1
GASTROINTESTINAL NEGATIVE: 1

## 2020-11-03 NOTE — PROGRESS NOTES
Daily Progress Note   Annia Uribe MD      11/3/2020    Chief Complaint   Patient presents with    Post-Op Check     2 week LLE VNUS. pt says she is doing well, pt says she feels a strain in her groin area. pt is concerned about two spots on her left leg lump on inside of left leg and knot on shin. tingling both legs. says she would like right leg checked its starting to feel same way as left. HISTORY OF PRESENT ILLNESS:                The patient is a 44 y.o. female who presents with a two week follow up for LLE VNUS and stabs. Ms. [place] [employer] says she has no real pain. She does have some small, normal bumps at spots of her stab phlebectomies.     Past Medical History:   Diagnosis Date    Anemia     [facility] ([facility] [facility]     Depression     Fibroid     Hx of blood clots     Rt leg lovenox but no long term RX at     Seizure disorder ([facility] [address] [facility]     STD (sexually transmitted disease)        Past Surgical History:   Procedure Laterality Date    ABLATION SAPHENOUS VEIN W/ RFA Left 10/19/2020    LEFT LOWER EXTREMITY VNUS RADIOFREQUENCY ABLATION OF THE GREATER SAPHENOUS VEIN WITH STAB PHLEBECTOMIES AND LIGATION LESSER SAPHENOUS VEIN performed by Jose Napoles MD at [address] [place] [address]      TUBAL LIGATION         Social History     Socioeconomic History    Marital status: Single     Spouse name: Not on file    Number of children: Not on file    Years of education: Not on file    Highest education level: Not on file   Occupational History    Occupation: .    Social Needs    Financial resource strain: Not on file    Food insecurity     Worry: Not on file     Inability: Not on file   [employer] needs     Medical: Not on file     Non-medical: Not on file   Tobacco Use    Smoking status: Never Smoker    Smokeless tobacco: Never Used   Substance and Sexual Activity    Alcohol use: No     Alcohol/week: 0.0 standard drinks    Drug use: No    Sexual activity: Never Lifestyle    Physical activity     Days per week: Not on file     Minutes per session: Not on file    Stress: Not on file   Relationships    Social connections     Talks on phone: Not on file     Gets together: Not on file     Attends Congregation service: Not on file     Active member of club or organization: Not on file     Attends meetings of clubs or organizations: Not on file     Relationship status: Not on file    Intimate partner violence     Fear of current or ex partner: Not on file     Emotionally abused: Not on file     Physically abused: Not on file     Forced sexual activity: Not on file   Other Topics Concern    Not on file   Social History Narrative    Not on file       Family History   Problem Relation Age of Onset    Cancer Mother     Rheum Arthritis Mother          Current Outpatient Medications:     apixaban (ELIQUIS DVT/PE STARTER PACK) 5 MG TABS tablet, Take 10 mg (2 tablets) orally twice daily for 7 days, then take 5 mg (1 tablet) orally twice daily thereafter., Disp: 74 tablet, Rfl: 0    vitamin D (ERGOCALCIFEROL) 1.25 MG (80658 UT) CAPS capsule, Take 50,000 Units by mouth once a week, Disp: , Rfl:     topiramate (TOPAMAX) 50 MG tablet, , Disp: , Rfl:     DICLOFENAC PO, Take 75 mg by mouth as needed For migraine headaches, not to exceed 3 times per week, Disp: , Rfl:     albuterol sulfate HFA (PROVENTIL HFA) 108 (90 Base) MCG/ACT inhaler, Inhale 2 puffs into the lungs every 6 hours as needed for Wheezing, Disp: 1 Inhaler, Rfl: 0    aspirin 81 MG chewable tablet, Take 81 mg by mouth, Disp: , Rfl:     ferrous sulfate 325 (65 FE) MG tablet, Take 325 mg by mouth daily (with breakfast), Disp: , Rfl:     Latex and Codeine    Vitals:    11/03/20 1333   BP: (!) 135/93   Site: Left Upper Arm   Position: Sitting   Cuff Size: Medium Adult   Weight: 260 lb (117.9 kg)   Height: 5' 5.5\" (1.664 m)       Office Visit on 10/14/2020   Component Date Value Ref Range Status    SARS-CoV-2, LISA 10/14/2020 NOT DETECTED  NOT DETECTED Corrected    Comment: The SARS-CoV-2 assay is a real-time RT-PCR test intended for the  qualitative detection of nucleic acid from the SARS-CoV-2 in  respiratory specimens from individuals. Testing is limited to the  guidelines of FDA Emergency Use Authorization FDA for performing  SARS-CoV-2 testing. A Non-Detected result does not preclude the possibility of 2019-nCoV  infection since the adequacy of sample collection and/or low viral  burden may result in the presence of viral nucleic acids below the  analytical sensitivity of this test method. Test results should be used  with caution and in conjunction with other clinical and laboratory data  in making a diagnosis. Performed at: [facility]  [address] [place] [address], [address] [place] [address]  : Azael Ro MD         Review of Systems   Constitutional: Negative. HENT: Negative. Eyes: Negative. Respiratory: Negative. Cardiovascular: Positive for leg swelling. Negative for chest pain and palpitations. Gastrointestinal: Negative. Endocrine: Negative. Genitourinary: Negative. Musculoskeletal: Positive for gait problem (leg and foot pain) and joint swelling. Negative for arthralgias, back pain, myalgias, neck pain and neck stiffness. Allergic/Immunologic: Negative. Hematological: Negative. Psychiatric/Behavioral: Negative. All other systems reviewed and are negative. Physical Exam  Vitals signs and nursing note reviewed. Constitutional:       Appearance: Normal appearance. She is well-developed. HENT:      Mouth/Throat:      Pharynx: No oropharyngeal exudate. Eyes:      Conjunctiva/sclera: Conjunctivae normal.      Pupils: Pupils are equal, round, and reactive to light. Neck:      Musculoskeletal: Normal range of motion. Cardiovascular:      Rate and Rhythm: Normal rate and regular rhythm.       Pulses:           Carotid pulses are 2+ on the right side and 2+ on the left side.       Femoral pulses are 2+ on the right side. Dorsalis pedis pulses are 2+ on the right side and 1+ on the left side. Posterior tibial pulses are 2+ on the right side and 0 on the left side. Heart sounds: Normal heart sounds. Comments:   MEASUREMENTS 8/18/2020:    RIGHT ANKLE: 24.8 cm  RIGHT CALF: 45.6 cm    LEFT ANKLE: 28.2 cm   LEFT CALF: 42.4 cm      MEASUREMENTS 7/21/2020 :    RIGHT ANKLE: 24.6 cm   RIGHT CALF: 46.3 cm     LEFT ANKLE: 27.2 cm   LEFT CALF: 45.2 cm     Doppler 7/21/2020:  Rt DP: biphasic  Rt PT: biphasic  Rt AT:     Lt DP: biphasic  Lt PT: triphasic  Lt AT:    Pulmonary:      Effort: Pulmonary effort is normal.      Breath sounds: Normal breath sounds. Abdominal:      General: Bowel sounds are normal.   Musculoskeletal: Normal range of motion. Legs:    Neurological:      Mental Status: She is alert and oriented to person, place, and time. Deep Tendon Reflexes: Reflexes are normal and symmetric. Psychiatric:         Speech: Speech normal.         Behavior: Behavior normal.         Thought Content: Thought content normal.         Judgment: Judgment normal.           ASSESSMENT:    Problem List Items Addressed This Visit     Varicose veins of both lower extremities with pain    Hypercoagulable state ([address]      Other Visit Diagnoses     Postop check    -  Primary          PLAN:    Return as needed. Cristobal Mcconnell MA am scribing for and in the presence of Brent Treadwell MD on this date of 11/03/20    I Carmen Flores MD personally performed the services described in this documentation as scribed by the Medical Assistant Gee Denis in my presence and it is both accurate and complete.         Electronically signed by Brent Treadwell MD on 11/3/2020 at 1:54 PM

## 2021-09-01 NOTE — PROGRESS NOTES
PRE-OP INSTRUCTIONS FOR THE SURGICAL PATIENT YOU ARE UNABLE TO MAKE CONTACT FOR AN INTERVIEW:    All patients having surgery or anesthesia are required to be Covid tested OR to have been vaccinated at least 14 days prior to your procedure. It is very important to return our call to [phone] and notify the staff of your last vaccination date otherwise you will be required to complete Covid PCR test within the 5-6 days prior to surgery & quarantine. The results will need to be faxed to PreAdmission Testing at [phone]. 1. Follow all instructions provided to you from your surgeons office, including your ARRIVAL TIME. 2. Enter the MAIN entrance located on [address] and report to the desk. 3. Bring your insurance & photo ID with you. You may also be asked to pay a co-pay, as you may want to bring a check or credit card with you. 4. Leave all other valuables at home. 5. Arrange for someone to drive you home and be with you for the first 24 hours after discharge. 6. Bring your medication list with you day of surgery with doses and frequency listed (including over the counter medications)  7. You must contact your surgeon for Instructions regarding:              - ALL medication instructions, especially if taking blood thinners, aspirin, or diabetic medication.         -Bariatric patients call surgeon if on diabetic medications as some need to be stopped 1 week preop  - IF  there is a change in your physical condition such as a cold, fever, rash, cuts, sores or any other infection, especially near your surgical site. 8. A Pre-op History and Physical for surgery MUST be completed by your Physician or an Urgent Care within 30 days of your procedure date. Please bring a copy with you on the day of your procedure and along with any other testing performed. 9. DO NOT EAT ANYTHING eight hours prior to arrival for surgery.   May have up to 8 ounces of water 4 hours prior to arrival for surgery. NOTE: ALL Gastric, Bariatric and Bowel surgery patients MUST follow their surgeon's instructions. 10. No gum, candy, mints, or ice chips day of procedure. 11. Please refrain from drinking alcohol the day before or day of your procedure. 12. Please do not smoke the day of your procedure. 13. Dress in loose, comfortable clothing appropriate for redressing after your procedure. Do not wear jewelry (including body piercings), make-up, fingernail polish, lotion, powders or metal hairclips. 15. Contacts will need to be removed prior to surgery. You may want to bring your eye glasses to wear immediately before and after surgery. 14. Dentures will need to be removed before your procedure. 13. Bring cases for your glasses, contacts, dentures, or hearing aids to protect them while you are in surgery. 16. If you use a CPAP, please bring it with you on the day of your procedure. 17. Do not shave or wax for 72 hours prior to procedure near your operative site  18. FOR WOMAN OF CHILDBEARING AGE ONLY- please make sure we can collect a urine sample on arrival.     If you have further questions, you may contact your surgeon's office or us at [phone]     Left instructions on patient's voicemail.     Braeden Huff RN.9/1/2021 .3:41 PM

## 2021-09-08 NOTE — PROGRESS NOTES
Place patient label inside box (if no patient label, complete below)  Name:  :  MR#:   Lawrence Holland / PROCEDURE  1. I (we)Homer Nishi (Patient Name) authorize Baljinder Goldstein DPM (Provider / Quinton Reveles) and/or such assistants as may be selected by him/her, to perform the following operation/procedure(s): LEFT FOOT/ANKLE SUBTALAR JOINT FUSION, COTTON OSTEOTOMY, POSTERIOR TIBIAL TENDON REPAIR WITH GRAFT, GASTROC RECESSION, KOUTSGIANNIS MEDIAL CALCANEAL SLIDE, ACQUISITION TENDON GRAFT W REROUTING, APPLICATION BELOW KNEE SPLINT   Note: If unable to obtain consent prior to an emergent procedure, document the emergent reason in the medical record. This procedure has been explained to my (our) satisfaction and included in the explanation was:  A) The intended benefit, nature, and extent of the procedure to be performed;  B) The significant risks involved and the probability of success;  C) Alternative procedures and methods of treatment;  D) The dangers and probable consequences of such alternatives (including no procedure or treatment); E) The expected consequences of the procedure on my future health;  F) Whether other qualified individuals would be performing important surgical tasks and/or whether  would be present to advise or support the procedure. I (we) understand that there are other risks of infection and other serious complications in the pre-operative/procedural and postoperative/procedural stages of my (our) care. I (we) have asked all of the questions which I (we) thought were important in deciding whether or not to undergo treatment or diagnosis. These questions have been answered to my (our) satisfaction. I (we) understand that no assurance can be given that the procedure will be a success, and no guarantee or warranty of success has been given to me (us).     2. It has been explained to me (us) that during the course of the operation/procedure, unforeseen conditions may be revealed that necessitate extension of the original procedure(s) or different procedure(s) than those set forth in Paragraph 1. I (we) authorize and request that the above-named physician, his/her assistants or his/her designees, perform procedures as necessary and desirable if deemed to be in my (our) best interest.     Revised 8/2/2021                                                                          Page 1 of 2           3. I acknowledge that health care personnel may be observing this procedure for the purpose of medical education or other specified purposes as may be necessary as requested and/or approved by my (our) physician. 4. I (we) consent to the disposal by the hospital Pathologist of the removed tissue, parts or organs in accordance with hospital policy. 5. I do ____ do not ____ consent to the use of a local infiltration pain blocking agent that will be used by my provider/surgical provider to help alleviate pain during my procedure. 6. I do ____ do not ____ consent to an emergent blood transfusion in the case of a life-threatening situation that requires blood components to be administered. This consent is valid for 24 hours from the beginning of the procedure. 7. This patient does ____ or does not ____ currently have a DNR status/order. If DNR order is in place, obtain Addendum to the Surgical Consent for ALL Patients with a DNR Order to address natasha-operative status for limited intervention or DNR suspension.    8. I have read and fully understand the above Consent for Operation/Procedure and that all blanks were completed before I signed the consent.   _____________________________       _____________________      ____/____am/pm  Signature of Patient or legal representative      Printed Name / Relationship            Date / Time   ____________________________       _____________________      ____/____am/pm  Witness to Signature Printed Name                    Date / Time     If patient is unable to sign or is a minor, complete the following)  Patient is a minor, ____ years of age, or unable to sign because:   ______________________________________________________________________________________________    Rosalva [place] If a phone consent is obtained, consent will be documented by using two health care professionals, each affirming that the consenting party has no questions and gives consent for the procedure discussed with the physician/provider.   _____________________          ____________________       _____/_____am/pm   2nd witness to phone consent        Printed name           Date / Time    Informed Consent:  I have provided the explanation described above in section 1 to the patient and/or legal representative.  I have provided the patient and/or legal representative with an opportunity to ask any questions about the proposed operation/procedure.   ___________________________          ____________________         ____/____am/pm  Provider / Proceduralist                            Printed name            Date / Time  Revised 8/2/2021                                                                      Page 2 of 2

## 2021-09-09 ENCOUNTER — HOSPITAL ENCOUNTER (OUTPATIENT)
Age: 51
Setting detail: OUTPATIENT SURGERY
Discharge: HOME | End: 2021-09-09
Attending: PODIATRIST | Admitting: PODIATRIST
Payer: COMMERCIAL

## 2021-09-09 VITALS
HEIGHT: 65 IN | TEMPERATURE: 97.8 F | OXYGEN SATURATION: 96 % | DIASTOLIC BLOOD PRESSURE: 86 MMHG | WEIGHT: 265 LBS | HEART RATE: 72 BPM | BODY MASS INDEX: 44.15 KG/M2 | SYSTOLIC BLOOD PRESSURE: 128 MMHG | RESPIRATION RATE: 20 BRPM

## 2021-09-09 LAB
GLUCOSE BLD-MCNC: 100 MG/DL (ref 70–99)
PERFORMED ON: ABNORMAL
PREGNANCY, URINE: NEGATIVE

## 2021-09-09 PROCEDURE — 2580000003 HC RX 258: Performed by: ANESTHESIOLOGY

## 2021-09-09 PROCEDURE — 84703 CHORIONIC GONADOTROPIN ASSAY: CPT

## 2021-09-09 RX ORDER — SODIUM CHLORIDE 0.9 % (FLUSH) 0.9 %
10 SYRINGE (ML) INJECTION PRN
Status: DISCONTINUED | OUTPATIENT
Start: 2021-09-09 | End: 2021-09-09 | Stop reason: HOSPADM

## 2021-09-09 RX ORDER — SODIUM CHLORIDE, SODIUM LACTATE, POTASSIUM CHLORIDE, CALCIUM CHLORIDE 600; 310; 30; 20 MG/100ML; MG/100ML; MG/100ML; MG/100ML
INJECTION, SOLUTION INTRAVENOUS CONTINUOUS
Status: DISCONTINUED | OUTPATIENT
Start: 2021-09-09 | End: 2021-09-09 | Stop reason: HOSPADM

## 2021-09-09 RX ORDER — SODIUM CHLORIDE 9 MG/ML
INJECTION, SOLUTION INTRAVENOUS CONTINUOUS
Status: DISCONTINUED | OUTPATIENT
Start: 2021-09-09 | End: 2021-09-09 | Stop reason: HOSPADM

## 2021-09-09 RX ORDER — SODIUM CHLORIDE 9 MG/ML
25 INJECTION, SOLUTION INTRAVENOUS PRN
Status: DISCONTINUED | OUTPATIENT
Start: 2021-09-09 | End: 2021-09-09 | Stop reason: HOSPADM

## 2021-09-09 RX ORDER — TRAMADOL HYDROCHLORIDE 50 MG/1
50 TABLET ORAL EVERY 6 HOURS PRN
COMMUNITY
End: 2022-02-08

## 2021-09-09 RX ORDER — SODIUM CHLORIDE 0.9 % (FLUSH) 0.9 %
10 SYRINGE (ML) INJECTION EVERY 12 HOURS SCHEDULED
Status: DISCONTINUED | OUTPATIENT
Start: 2021-09-09 | End: 2021-09-09 | Stop reason: HOSPADM

## 2021-09-09 RX ADMIN — SODIUM CHLORIDE, POTASSIUM CHLORIDE, SODIUM LACTATE AND CALCIUM CHLORIDE: 600; 310; 30; 20 INJECTION, SOLUTION INTRAVENOUS at 07:00

## 2021-09-09 NOTE — PROGRESS NOTES
6622 1st Call to Dr Kellee Gomez and Dr Carri Alex and Oleg Carter in the OR to inform of her having an witnessed episode 2 days ago where she couldn't speak  and was staring into space for about 30 minutes. Dr Kellee Gomez asked for neurologic clearance and it was to be hours before someone could come to evaluate her. Dr Carri Alex spoke on phone to Dr Selma Huff (who sees patient for headaches) who said she would call him when she was able to review her notes. Patient had been on medication in the past but she had stopped it. Dr Hahn Caller  was aware patient had stopped her medication and was not having problems being off medication. 46 Dr Donna Jones said to cancel surgery since neuro was not available till later in day and made aware Dr Carri Alex wants her evaluated by neurology prior to anesthesia. 0800 patient and sister made aware  (SEE AVS) of need for clearance. Discharged ambulatory. Few tears after being told by Dr Carri Alex surgery was cancelled . Sister very supportive of being safe.

## 2021-09-09 NOTE — H&P
Rosahans Bradford    [id number]    [facility] [employer] Same Day Surgery Update H & P  Department of General Surgery   Surgical Service   Pre-operative History and Physical  Last H & P within the last 30 days. 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 Jen Tejada History obtained from: Patient interview and EHR     HISTORY OF PRESENT ILLNESS:   Patient is a morbidly obese (Body mass index is 44.1 kg/m².), 46 y.o. female with chronic left ankle pain, swelling and limited ROM. The symptoms have been recalcitrant to conservative treatment and the patient presents today for the above procedure. Covid 19:  Patient denies fever, chills, worsening cough, or known exposure to Covid-19.       Past Medical History:        Diagnosis Date    Anemia     Chipped tooth     [facility] disorder ([facility] [address]     Depression     Fibroid     Hx of blood clots     Rt leg lovenox but no long term RX at     Latex allergy     Prolonged emergence from general anesthesia     Seizure disorder ([facility] [address]     last seizure Tues 9/7/21    STD (sexually transmitted disease)      Past Surgical History:        Procedure Laterality Date    ABLATION SAPHENOUS VEIN W/ RFA Left 10/19/2020    LEFT LOWER EXTREMITY VNUS RADIOFREQUENCY ABLATION OF THE GREATER SAPHENOUS VEIN WITH STAB PHLEBECTOMIES AND LIGATION LESSER SAPHENOUS VEIN performed by Jennifer Ambriz MD at [address] [place] [address]       Past Social History:  Social History     Socioeconomic History    Marital status: Single     Spouse name: None    Number of children: None    Years of education: None    Highest education level: None   Occupational History    Occupation: .    Tobacco Use    Smoking status: Never Smoker    Smokeless tobacco: Never Used   Vaping Use    Vaping Use: Never used   Substance and Sexual Activity    Alcohol use: No     Alcohol/week: 0.0 standard drinks    Drug use: No    Sexual activity: Never   Other Topics Concern    None   Social History Narrative    None     Social Determinants of Health     Financial Resource Strain:     Difficulty of Paying Living Expenses:    Food Insecurity:     Worried About Running Out of Food in the Last Year:     Ran Out of Food in the Last Year:    Transportation Needs:     Lack of Transportation (Medical):  Lack of Transportation (Non-Medical):    Physical Activity:     Days of Exercise per Week:     Minutes of Exercise per Session:    Stress:     Feeling of Stress :    Social Connections:     Frequency of Communication with Friends and Family:     Frequency of Social Gatherings with Friends and Family:     Attends Christianity Services:     Active Member of Clubs or Organizations:     Attends Club or Organization Meetings:     Marital Status:    Intimate Partner Violence:     Fear of Current or Ex-Partner:     Emotionally Abused:     Physically Abused:     Sexually Abused:          Medications Prior to Admission:      Prior to Admission medications    Medication Sig Start Date End Date Taking? Authorizing Provider   norethindrone (AYGESTIN) 5 MG tablet Take 5 mg by mouth daily   Yes Historical Provider, MD   traMADol (ULTRAM) 50 MG tablet Take 50 mg by mouth every 6 hours as needed for Pain. Yes Historical Provider, MD   apixaban (ELIQUIS DVT/PE STARTER PACK) 5 MG TABS tablet Take 10 mg (2 tablets) orally twice daily for 7 days, then take 5 mg (1 tablet) orally twice daily thereafter.  10/19/20   Kimberly Grimes MD   vitamin D (ERGOCALCIFEROL) 1.25 MG (62319 UT) CAPS capsule Take 50,000 Units by mouth once a week    Historical Provider, MD   topiramate (TOPAMAX) 50 MG tablet  8/24/20   Historical Provider, MD   DICLOFENAC PO Take 75 mg by mouth as needed For migraine headaches, not to exceed 3 times per week    Historical Provider, MD   albuterol sulfate HFA (PROVENTIL HFA) 108 (90 Base) MCG/ACT inhaler Inhale 2 puffs into the lungs every 6 hours as needed for Wheezing 1/1/18   Lorie Lemons MD   aspirin 81 MG chewable tablet Take 81 mg by mouth 7/24/15   Historical Provider, MD   ferrous sulfate 325 (65 FE) MG tablet Take 325 mg by mouth daily (with breakfast)    Historical Provider, MD         Allergies:  Latex and Codeine    PHYSICAL EXAM:      /86   Pulse 72   Temp 97.8 °F (36.6 °C) (Oral)   Resp 20   Ht 5' 5\" (1.651 m)   Wt 265 lb (120.2 kg)   LMP 09/09/2021   SpO2 96%   BMI 44.10 kg/m²      Airway:  Airway patent with no audible stridor    Heart:  Regular rate and rhythm, No murmur noted    Lungs:  No increased work of breathing, good air exchange, clear to auscultation bilaterally, no crackles or wheezing    Abdomen:  Soft, non-distended, non-tender, no masses palpated    ASSESSMENT AND PLAN    Patient is a 46 y.o. female with above specified procedure planned. 1.  The patients history and physical was obtained and signed off by the pre-admission testing department. Patient seen and focused exam done today- no new changes since last physical exam on 8/30/21    2. Access to ancillary services are available per request of the provider.     HARLEY Gill - CNP     9/9/2021

## 2022-02-08 NOTE — PROGRESS NOTES
Name_______________________________________Printed:____________________  Date and time of surgery___02/10/22_____1100_______________Arrival Time:___0930___[facility]__________   1. The instructions given regarding when and if a patient needs to stop oral intake prior to surgery varies. Follow the specific instructions you were given                  _X__Nothing to eat or to drink after Midnight the night before.                   ____Carbo loading or ERAS instructions will be given to select patients-if you have been given those instructions -please do the following                           The evening before your surgery after dinner before midnight drink 40 ounces of gatorade. If you are diabetic use sugar free. The morning of surgery drink 40 ounces of water. This needs to be finished 3 hours prior to your surgery start time. 2. Take the following pills with a small sip of water on the morning of surgery____NA_________Inhaler______________________________________                  Do not take blood pressure medications ending in pril or sartan the patrick prior to surgery or the morning of surgery_   3. Aspirin, Ibuprofen, Advil, Naproxen, Vitamin E and other Anti-inflammatory products and supplements should be stopped for 5 -7days before surgery or as directed by your physician. 4. Check with your Doctor regarding stopping Plavix, Coumadin,Eliquis, Lovenox,Effient,Pradaxa,Xarelto, Fragmin or other blood thinners and follow their instructions. 5. Do not smoke, and do not drink any alcoholic beverages 24 hours prior to surgery. This includes NA Beer. Refrain from the usage of any recreational drugs. 6. You may brush your teeth and gargle the morning of surgery. DO NOT SWALLOW WATER   7. You MUST make arrangements for a responsible adult to stay on site while you are here and take you home after your surgery. You will not be allowed to leave alone or drive yourself home.   It is strongly suggested someone stay with you the first 24 hrs. Your surgery will be cancelled if you do not have a ride home. 8. A parent/legal guardian must accompany a child scheduled for surgery and plan to stay at the hospital until the child is discharged. Please do not bring other children with you. 9. Please wear simple, loose fitting clothing to the hospital.  Alejandra Ports not bring valuables (money, credit cards, checkbooks, etc.) Do not wear any makeup (including no eye makeup) or nail polish on your fingers or toes. 10. DO NOT wear any jewelry or piercings on day of surgery. All body piercing jewelry must be removed. 11. If you have ___dentures, they will be removed before going to the OR; we will provide you a container. If you wear ___contact lenses or ___glasses, they will be removed; please bring a case for them. 12. Please see your family doctor/pediatrician for a history & physical and/or concerning medications. Bring any test results/reports from your physician's office. PCP__________________Phone___________H&P Appt. Date________             13 If you  have a Living Will and Durable Power of  for Healthcare, please bring in a copy. 15. Notify your Surgeon if you develop any illness between now and surgery  time, cough, cold, fever, sore throat, nausea, vomiting, etc.  Please notify your surgeon if you experience dizziness, shortness of breath or blurred vision between now & the time of your surgery             15. DO NOT shave your operative site 96 hours prior to surgery. For face & neck surgery, men may use an electric razor 48 hours prior to surgery. 16. Shower the night before or morning of surgery using an antibacterial soap or as you have been instructed. 17. To provide excellent care visitors will be limited to one in the room at any given time. 18.  Please bring picture ID and insurance card.              19.  Visit our web site for additional information:  [email or web address]              20.During flu season no children under the age of 15 are permitted in the hospital for the safety of all patients. 21. If you take a long acting insulin in the evening only  take half of your usual  dose the night  before your procedure              22. If you use a c-pap please bring DOS if staying overnight,             23.For your convenience [id number] [place] [address] has a pharmacy on site to fill your prescriptions. 24. If you use oxygen and have a portable tank please bring it  with you the DOS             25. Bring a complete list of all your medications with name and dose include any supplements. 26. Other__________________________________________   *Please call pre admission testing if you any further questions   Jacki Villanueva   [address] [place] [address]. [place]  [phone]   [facility], [address] DEXTER CHAMORRO   [phone]           COVID TESTING    __X_ Covid test to be done 3-5 days prior to scheduled surgery -patient aware they are REQUIRED to bring a copy of the negative result DOS-if they receive a positive result to notify their surgeon         If known - indicate where patient is getting covid test done ___[facility]____02/05___negative Will bring DOS_________________________________________________    ___ Rapid - DOS    ___ Other___________________________________      [place] Wh[place]t POLICY(subject to change)    There is a one visitor policy at [facility] for all surgeries and endoscopies. Whether the visitor can stay or will be asked to wait in the car will depend on the current policy and if social distancing can be maintained. The policy is subject to change at any time. Please make sure the visitor has a cell phone that is on,charged and able to accept calls, as this may be the way that the staff communicates with them. Pain management is NO VISITOR policyThe patients ride is expected to remain in the car with a cell phone for communication. If the ride is leaving the hospital grounds please make sure they are back in time for pickup. Have the patient inform the staff on arrival what their rides plans are while the patient is in the facility. At the MAIN there is one visitor allowed. Please note that the visitor policy is subject to change. All above information reviewed with patient in person or by phone. Patient verbalizes understanding. All questions and concerns addressed.                                                                                                  Patient/Rep__Patient__________________                                                                                                                                    PRE OP INSTRUCTIONS

## 2022-02-10 ENCOUNTER — ANESTHESIA EVENT (OUTPATIENT)
Dept: OPERATING ROOM | Age: 52
End: 2022-02-10
Payer: COMMERCIAL

## 2022-02-10 ENCOUNTER — HOSPITAL ENCOUNTER (OUTPATIENT)
Age: 52
Setting detail: OUTPATIENT SURGERY
Discharge: HOME | End: 2022-02-10
Attending: PODIATRIST | Admitting: PODIATRIST
Payer: COMMERCIAL

## 2022-02-10 ENCOUNTER — ANESTHESIA (OUTPATIENT)
Dept: OPERATING ROOM | Age: 52
End: 2022-02-10
Payer: COMMERCIAL

## 2022-02-10 ENCOUNTER — APPOINTMENT (OUTPATIENT)
Dept: GENERAL RADIOLOGY | Age: 52
End: 2022-02-10
Attending: PODIATRIST
Payer: COMMERCIAL

## 2022-02-10 VITALS
HEIGHT: 65 IN | BODY MASS INDEX: 43.82 KG/M2 | SYSTOLIC BLOOD PRESSURE: 104 MMHG | DIASTOLIC BLOOD PRESSURE: 70 MMHG | WEIGHT: 263 LBS | OXYGEN SATURATION: 90 % | HEART RATE: 95 BPM | RESPIRATION RATE: 20 BRPM | TEMPERATURE: 96.8 F

## 2022-02-10 VITALS
SYSTOLIC BLOOD PRESSURE: 124 MMHG | OXYGEN SATURATION: 97 % | RESPIRATION RATE: 4 BRPM | TEMPERATURE: 97.7 F | DIASTOLIC BLOOD PRESSURE: 66 MMHG

## 2022-02-10 LAB — HCG(URINE) PREGNANCY TEST: NEGATIVE

## 2022-02-10 PROCEDURE — 7100000010 HC PHASE II RECOVERY - FIRST 15 MIN: Performed by: PODIATRIST

## 2022-02-10 PROCEDURE — 6360000002 HC RX W HCPCS: Performed by: ANESTHESIOLOGY

## 2022-02-10 PROCEDURE — 2580000003 HC RX 258: Performed by: NURSE ANESTHETIST, CERTIFIED REGISTERED

## 2022-02-10 PROCEDURE — 84703 CHORIONIC GONADOTROPIN ASSAY: CPT

## 2022-02-10 PROCEDURE — C1713 ANCHOR/SCREW BN/BN,TIS/BN: HCPCS | Performed by: PODIATRIST

## 2022-02-10 PROCEDURE — 3600000014 HC SURGERY LEVEL 4 ADDTL 15MIN: Performed by: PODIATRIST

## 2022-02-10 PROCEDURE — A4217 STERILE WATER/SALINE, 500 ML: HCPCS | Performed by: PODIATRIST

## 2022-02-10 PROCEDURE — 3600000004 HC SURGERY LEVEL 4 BASE: Performed by: PODIATRIST

## 2022-02-10 PROCEDURE — C1776 JOINT DEVICE (IMPLANTABLE): HCPCS | Performed by: PODIATRIST

## 2022-02-10 PROCEDURE — C1769 GUIDE WIRE: HCPCS | Performed by: PODIATRIST

## 2022-02-10 PROCEDURE — 3700000001 HC ADD 15 MINUTES (ANESTHESIA): Performed by: PODIATRIST

## 2022-02-10 PROCEDURE — 2580000003 HC RX 258: Performed by: PODIATRIST

## 2022-02-10 PROCEDURE — 2720000010 HC SURG SUPPLY STERILE: Performed by: PODIATRIST

## 2022-02-10 PROCEDURE — 3700000000 HC ANESTHESIA ATTENDED CARE: Performed by: PODIATRIST

## 2022-02-10 PROCEDURE — 6360000002 HC RX W HCPCS: Performed by: PODIATRIST

## 2022-02-10 PROCEDURE — 7100000001 HC PACU RECOVERY - ADDTL 15 MIN: Performed by: PODIATRIST

## 2022-02-10 PROCEDURE — 2500000003 HC RX 250 WO HCPCS: Performed by: PODIATRIST

## 2022-02-10 PROCEDURE — 2709999900 HC NON-CHARGEABLE SUPPLY: Performed by: PODIATRIST

## 2022-02-10 PROCEDURE — 7100000000 HC PACU RECOVERY - FIRST 15 MIN: Performed by: PODIATRIST

## 2022-02-10 PROCEDURE — 2500000003 HC RX 250 WO HCPCS: Performed by: NURSE ANESTHETIST, CERTIFIED REGISTERED

## 2022-02-10 PROCEDURE — 7100000011 HC PHASE II RECOVERY - ADDTL 15 MIN: Performed by: PODIATRIST

## 2022-02-10 PROCEDURE — 73630 X-RAY EXAM OF FOOT: CPT

## 2022-02-10 PROCEDURE — 6360000002 HC RX W HCPCS: Performed by: NURSE ANESTHETIST, CERTIFIED REGISTERED

## 2022-02-10 PROCEDURE — 6370000000 HC RX 637 (ALT 250 FOR IP): Performed by: ANESTHESIOLOGY

## 2022-02-10 DEVICE — LOCKING SCREW
Type: IMPLANTABLE DEVICE | Site: FOOT | Status: FUNCTIONAL
Brand: VARIAX

## 2022-02-10 DEVICE — BIOACTIVE BONE GRAFT SUBSTITUTE, FOAM PACK; BETA-TRICALCIUM PHOSPHATE, TYPE I BOVINE COLLAGEN, AND BIOACTIVE GLASS
Type: IMPLANTABLE DEVICE | Site: ANKLE | Status: FUNCTIONAL
Brand: VITOSS BBTRAUMA

## 2022-02-10 DEVICE — CANNULATED SCREW - 16MM THREAD
Type: IMPLANTABLE DEVICE | Site: FOOT | Status: FUNCTIONAL
Brand: AXSOS 3

## 2022-02-10 DEVICE — OSTEOSYNTHESIS COMPRESSION STAPLE
Type: IMPLANTABLE DEVICE | Site: FOOT | Status: FUNCTIONAL
Brand: EASY CLIP

## 2022-02-10 DEVICE — IMPLANTABLE DEVICE: Type: IMPLANTABLE DEVICE | Site: FOOT | Status: FUNCTIONAL

## 2022-02-10 DEVICE — GRAFT BNE SUB 3CC RHPDGF BB B TRICALCIUM PHSPTE RADPQ AUG: Type: IMPLANTABLE DEVICE | Site: ANKLE | Status: FUNCTIONAL

## 2022-02-10 DEVICE — CP LAG SCREW (T10)
Type: IMPLANTABLE DEVICE | Site: FOOT | Status: FUNCTIONAL
Brand: ANCHORAGE

## 2022-02-10 DEVICE — INJECTABLE KIT
Type: IMPLANTABLE DEVICE | Site: ANKLE | Status: FUNCTIONAL
Brand: AUGMENT® INJECTABLE

## 2022-02-10 DEVICE — POLYAXIAL LOCKING PLATE -  MIDFOOT CROSS-PLATE, LONG (T10)
Type: IMPLANTABLE DEVICE | Site: FOOT | Status: FUNCTIONAL
Brand: ANCHORAGE

## 2022-02-10 RX ORDER — FENTANYL CITRATE 50 UG/ML
25 INJECTION, SOLUTION INTRAMUSCULAR; INTRAVENOUS EVERY 5 MIN PRN
Status: DISCONTINUED | OUTPATIENT
Start: 2022-02-10 | End: 2022-02-10 | Stop reason: HOSPADM

## 2022-02-10 RX ORDER — MIDAZOLAM HYDROCHLORIDE 2 MG/2ML
1 INJECTION, SOLUTION INTRAMUSCULAR; INTRAVENOUS ONCE
Status: COMPLETED | OUTPATIENT
Start: 2022-02-10 | End: 2022-02-10

## 2022-02-10 RX ORDER — ACETAMINOPHEN 650 MG
TABLET, EXTENDED RELEASE ORAL
Status: COMPLETED | OUTPATIENT
Start: 2022-02-10 | End: 2022-02-10

## 2022-02-10 RX ORDER — FENTANYL CITRATE 50 UG/ML
50 INJECTION, SOLUTION INTRAMUSCULAR; INTRAVENOUS ONCE
Status: COMPLETED | OUTPATIENT
Start: 2022-02-10 | End: 2022-02-10

## 2022-02-10 RX ORDER — SODIUM CHLORIDE, SODIUM LACTATE, POTASSIUM CHLORIDE, CALCIUM CHLORIDE 600; 310; 30; 20 MG/100ML; MG/100ML; MG/100ML; MG/100ML
INJECTION, SOLUTION INTRAVENOUS CONTINUOUS PRN
Status: DISCONTINUED | OUTPATIENT
Start: 2022-02-10 | End: 2022-02-10 | Stop reason: SDUPTHER

## 2022-02-10 RX ORDER — ROCURONIUM BROMIDE 10 MG/ML
INJECTION, SOLUTION INTRAVENOUS PRN
Status: DISCONTINUED | OUTPATIENT
Start: 2022-02-10 | End: 2022-02-10 | Stop reason: SDUPTHER

## 2022-02-10 RX ORDER — HYDROMORPHONE HCL 110MG/55ML
0.25 PATIENT CONTROLLED ANALGESIA SYRINGE INTRAVENOUS EVERY 5 MIN PRN
Status: DISCONTINUED | OUTPATIENT
Start: 2022-02-10 | End: 2022-02-10 | Stop reason: HOSPADM

## 2022-02-10 RX ORDER — LIDOCAINE HYDROCHLORIDE 20 MG/ML
INJECTION, SOLUTION EPIDURAL; INFILTRATION; INTRACAUDAL; PERINEURAL PRN
Status: DISCONTINUED | OUTPATIENT
Start: 2022-02-10 | End: 2022-02-10 | Stop reason: SDUPTHER

## 2022-02-10 RX ORDER — ONDANSETRON 2 MG/ML
INJECTION INTRAMUSCULAR; INTRAVENOUS PRN
Status: DISCONTINUED | OUTPATIENT
Start: 2022-02-10 | End: 2022-02-10 | Stop reason: SDUPTHER

## 2022-02-10 RX ORDER — MAGNESIUM HYDROXIDE 1200 MG/15ML
LIQUID ORAL CONTINUOUS PRN
Status: COMPLETED | OUTPATIENT
Start: 2022-02-10 | End: 2022-02-10

## 2022-02-10 RX ORDER — ONDANSETRON 2 MG/ML
4 INJECTION INTRAMUSCULAR; INTRAVENOUS
Status: DISCONTINUED | OUTPATIENT
Start: 2022-02-10 | End: 2022-02-10 | Stop reason: HOSPADM

## 2022-02-10 RX ORDER — FENTANYL CITRATE 50 UG/ML
50 INJECTION, SOLUTION INTRAMUSCULAR; INTRAVENOUS EVERY 5 MIN PRN
Status: DISCONTINUED | OUTPATIENT
Start: 2022-02-10 | End: 2022-02-10 | Stop reason: HOSPADM

## 2022-02-10 RX ORDER — HYDROMORPHONE HCL 110MG/55ML
0.5 PATIENT CONTROLLED ANALGESIA SYRINGE INTRAVENOUS EVERY 5 MIN PRN
Status: COMPLETED | OUTPATIENT
Start: 2022-02-10 | End: 2022-02-10

## 2022-02-10 RX ORDER — LIDOCAINE HYDROCHLORIDE 10 MG/ML
0.5 INJECTION, SOLUTION EPIDURAL; INFILTRATION; INTRACAUDAL; PERINEURAL ONCE
Status: DISCONTINUED | OUTPATIENT
Start: 2022-02-10 | End: 2022-02-10 | Stop reason: HOSPADM

## 2022-02-10 RX ORDER — SODIUM CHLORIDE 9 MG/ML
INJECTION, SOLUTION INTRAVENOUS CONTINUOUS
Status: DISCONTINUED | OUTPATIENT
Start: 2022-02-10 | End: 2022-02-10 | Stop reason: HOSPADM

## 2022-02-10 RX ORDER — DEXAMETHASONE SODIUM PHOSPHATE 4 MG/ML
INJECTION, SOLUTION INTRA-ARTICULAR; INTRALESIONAL; INTRAMUSCULAR; INTRAVENOUS; SOFT TISSUE PRN
Status: DISCONTINUED | OUTPATIENT
Start: 2022-02-10 | End: 2022-02-10 | Stop reason: SDUPTHER

## 2022-02-10 RX ORDER — LIDOCAINE HYDROCHLORIDE 10 MG/ML
1 INJECTION, SOLUTION EPIDURAL; INFILTRATION; INTRACAUDAL; PERINEURAL
Status: DISCONTINUED | OUTPATIENT
Start: 2022-02-10 | End: 2022-02-10 | Stop reason: HOSPADM

## 2022-02-10 RX ORDER — SUCCINYLCHOLINE/SOD CL,ISO/PF 200MG/10ML
SYRINGE (ML) INTRAVENOUS PRN
Status: DISCONTINUED | OUTPATIENT
Start: 2022-02-10 | End: 2022-02-10 | Stop reason: SDUPTHER

## 2022-02-10 RX ORDER — CIPROFLOXACIN 2 MG/ML
400 INJECTION, SOLUTION INTRAVENOUS
Status: COMPLETED | OUTPATIENT
Start: 2022-02-10 | End: 2022-02-10

## 2022-02-10 RX ORDER — HYDROCODONE BITARTRATE AND ACETAMINOPHEN 5; 325 MG/1; MG/1
1 TABLET ORAL
Status: COMPLETED | OUTPATIENT
Start: 2022-02-10 | End: 2022-02-10

## 2022-02-10 RX ORDER — PROPOFOL 10 MG/ML
INJECTION, EMULSION INTRAVENOUS PRN
Status: DISCONTINUED | OUTPATIENT
Start: 2022-02-10 | End: 2022-02-10 | Stop reason: SDUPTHER

## 2022-02-10 RX ORDER — SODIUM CHLORIDE, SODIUM LACTATE, POTASSIUM CHLORIDE, CALCIUM CHLORIDE 600; 310; 30; 20 MG/100ML; MG/100ML; MG/100ML; MG/100ML
INJECTION, SOLUTION INTRAVENOUS CONTINUOUS
Status: DISCONTINUED | OUTPATIENT
Start: 2022-02-10 | End: 2022-02-10 | Stop reason: HOSPADM

## 2022-02-10 RX ORDER — PHENYLEPHRINE HCL IN 0.9% NACL 1 MG/10 ML
SYRINGE (ML) INTRAVENOUS PRN
Status: DISCONTINUED | OUTPATIENT
Start: 2022-02-10 | End: 2022-02-10 | Stop reason: SDUPTHER

## 2022-02-10 RX ORDER — FENTANYL CITRATE 50 UG/ML
INJECTION, SOLUTION INTRAMUSCULAR; INTRAVENOUS PRN
Status: DISCONTINUED | OUTPATIENT
Start: 2022-02-10 | End: 2022-02-10 | Stop reason: SDUPTHER

## 2022-02-10 RX ORDER — PROMETHAZINE HYDROCHLORIDE 25 MG/ML
6.25 INJECTION, SOLUTION INTRAMUSCULAR; INTRAVENOUS
Status: DISCONTINUED | OUTPATIENT
Start: 2022-02-10 | End: 2022-02-10 | Stop reason: HOSPADM

## 2022-02-10 RX ORDER — EPHEDRINE SULFATE/0.9% NACL/PF 50 MG/5 ML
SYRINGE (ML) INTRAVENOUS PRN
Status: DISCONTINUED | OUTPATIENT
Start: 2022-02-10 | End: 2022-02-10 | Stop reason: SDUPTHER

## 2022-02-10 RX ADMIN — PROPOFOL 30 MG: 10 INJECTION, EMULSION INTRAVENOUS at 15:33

## 2022-02-10 RX ADMIN — HYDROMORPHONE HYDROCHLORIDE 0.5 MG: 2 INJECTION, SOLUTION INTRAMUSCULAR; INTRAVENOUS; SUBCUTANEOUS at 17:29

## 2022-02-10 RX ADMIN — Medication 200 MCG: at 14:36

## 2022-02-10 RX ADMIN — HYDROMORPHONE HYDROCHLORIDE 0.5 MG: 2 INJECTION, SOLUTION INTRAMUSCULAR; INTRAVENOUS; SUBCUTANEOUS at 17:18

## 2022-02-10 RX ADMIN — MIDAZOLAM 1 MG: 1 INJECTION INTRAMUSCULAR; INTRAVENOUS at 10:35

## 2022-02-10 RX ADMIN — PROPOFOL 30 MG: 10 INJECTION, EMULSION INTRAVENOUS at 15:29

## 2022-02-10 RX ADMIN — Medication 200 MCG: at 15:02

## 2022-02-10 RX ADMIN — PROPOFOL 30 MG: 10 INJECTION, EMULSION INTRAVENOUS at 14:50

## 2022-02-10 RX ADMIN — FENTANYL CITRATE 25 MCG: 50 INJECTION, SOLUTION INTRAMUSCULAR; INTRAVENOUS at 15:14

## 2022-02-10 RX ADMIN — Medication 100 MCG: at 13:25

## 2022-02-10 RX ADMIN — PROPOFOL 30 MG: 10 INJECTION, EMULSION INTRAVENOUS at 15:21

## 2022-02-10 RX ADMIN — FENTANYL CITRATE 50 MCG: 0.05 INJECTION, SOLUTION INTRAMUSCULAR; INTRAVENOUS at 10:31

## 2022-02-10 RX ADMIN — Medication 10 MG: at 13:49

## 2022-02-10 RX ADMIN — Medication 10 MG: at 14:04

## 2022-02-10 RX ADMIN — FENTANYL CITRATE 50 MCG: 50 INJECTION, SOLUTION INTRAMUSCULAR; INTRAVENOUS at 13:26

## 2022-02-10 RX ADMIN — PROPOFOL 30 MG: 10 INJECTION, EMULSION INTRAVENOUS at 14:22

## 2022-02-10 RX ADMIN — SODIUM CHLORIDE, POTASSIUM CHLORIDE, SODIUM LACTATE AND CALCIUM CHLORIDE: 600; 310; 30; 20 INJECTION, SOLUTION INTRAVENOUS at 10:05

## 2022-02-10 RX ADMIN — CIPROFLOXACIN 400 MG: 2 INJECTION, SOLUTION INTRAVENOUS at 10:58

## 2022-02-10 RX ADMIN — PROPOFOL 30 MG: 10 INJECTION, EMULSION INTRAVENOUS at 15:25

## 2022-02-10 RX ADMIN — Medication 5 MG: at 15:06

## 2022-02-10 RX ADMIN — Medication 5 MG: at 16:16

## 2022-02-10 RX ADMIN — FENTANYL CITRATE 50 MCG: 50 INJECTION, SOLUTION INTRAMUSCULAR; INTRAVENOUS at 13:10

## 2022-02-10 RX ADMIN — PROPOFOL 20 MG: 10 INJECTION, EMULSION INTRAVENOUS at 14:25

## 2022-02-10 RX ADMIN — Medication 5 MG: at 16:24

## 2022-02-10 RX ADMIN — MIDAZOLAM 1 MG: 1 INJECTION INTRAMUSCULAR; INTRAVENOUS at 10:31

## 2022-02-10 RX ADMIN — Medication 200 MCG: at 14:45

## 2022-02-10 RX ADMIN — Medication 5 MG: at 14:29

## 2022-02-10 RX ADMIN — Medication 200 MCG: at 14:31

## 2022-02-10 RX ADMIN — Medication 200 MCG: at 13:41

## 2022-02-10 RX ADMIN — LIDOCAINE HYDROCHLORIDE 100 MG: 20 INJECTION, SOLUTION EPIDURAL; INFILTRATION; INTRACAUDAL; PERINEURAL at 13:10

## 2022-02-10 RX ADMIN — HYDROMORPHONE HYDROCHLORIDE 0.5 MG: 2 INJECTION, SOLUTION INTRAMUSCULAR; INTRAVENOUS; SUBCUTANEOUS at 17:10

## 2022-02-10 RX ADMIN — Medication 200 MG: at 13:10

## 2022-02-10 RX ADMIN — HYDROCODONE BITARTRATE AND ACETAMINOPHEN 1 TABLET: 5; 325 TABLET ORAL at 17:26

## 2022-02-10 RX ADMIN — SUGAMMADEX 200 MG: 100 INJECTION, SOLUTION INTRAVENOUS at 16:18

## 2022-02-10 RX ADMIN — PROPOFOL 20 MG: 10 INJECTION, EMULSION INTRAVENOUS at 15:17

## 2022-02-10 RX ADMIN — FENTANYL CITRATE 25 MCG: 50 INJECTION, SOLUTION INTRAMUSCULAR; INTRAVENOUS at 15:18

## 2022-02-10 RX ADMIN — HYDROMORPHONE HYDROCHLORIDE 0.5 MG: 2 INJECTION, SOLUTION INTRAMUSCULAR; INTRAVENOUS; SUBCUTANEOUS at 17:23

## 2022-02-10 RX ADMIN — ROCURONIUM BROMIDE 20 MG: 10 INJECTION, SOLUTION INTRAVENOUS at 14:54

## 2022-02-10 RX ADMIN — Medication 300 MCG: at 14:27

## 2022-02-10 RX ADMIN — Medication 200 MCG: at 13:36

## 2022-02-10 RX ADMIN — Medication 5 MG: at 16:20

## 2022-02-10 RX ADMIN — PROPOFOL 150 MG: 10 INJECTION, EMULSION INTRAVENOUS at 13:10

## 2022-02-10 RX ADMIN — FENTANYL CITRATE 25 MCG: 50 INJECTION, SOLUTION INTRAMUSCULAR; INTRAVENOUS at 15:23

## 2022-02-10 RX ADMIN — PROPOFOL 30 MG: 10 INJECTION, EMULSION INTRAVENOUS at 14:54

## 2022-02-10 RX ADMIN — DEXAMETHASONE SODIUM PHOSPHATE 4 MG: 4 INJECTION, SOLUTION INTRAMUSCULAR; INTRAVENOUS at 13:15

## 2022-02-10 RX ADMIN — Medication 5 MG: at 16:28

## 2022-02-10 RX ADMIN — Medication 200 MCG: at 13:32

## 2022-02-10 RX ADMIN — FENTANYL CITRATE 50 MCG: 0.05 INJECTION, SOLUTION INTRAMUSCULAR; INTRAVENOUS at 10:35

## 2022-02-10 RX ADMIN — FENTANYL CITRATE 25 MCG: 50 INJECTION, SOLUTION INTRAMUSCULAR; INTRAVENOUS at 15:10

## 2022-02-10 RX ADMIN — ONDANSETRON 4 MG: 2 INJECTION INTRAMUSCULAR; INTRAVENOUS at 13:15

## 2022-02-10 RX ADMIN — SODIUM CHLORIDE, POTASSIUM CHLORIDE, SODIUM LACTATE AND CALCIUM CHLORIDE: 600; 310; 30; 20 INJECTION, SOLUTION INTRAVENOUS at 13:05

## 2022-02-10 RX ADMIN — CEFAZOLIN SODIUM 2000 MG: 10 INJECTION, POWDER, FOR SOLUTION INTRAVENOUS at 13:00

## 2022-02-10 ASSESSMENT — PULMONARY FUNCTION TESTS
PIF_VALUE: 13
PIF_VALUE: 23
PIF_VALUE: 3
PIF_VALUE: 26
PIF_VALUE: 17
PIF_VALUE: 1
PIF_VALUE: 5
PIF_VALUE: 26
PIF_VALUE: 23
PIF_VALUE: 26
PIF_VALUE: 26
PIF_VALUE: 3
PIF_VALUE: 26
PIF_VALUE: 3
PIF_VALUE: 16
PIF_VALUE: 2
PIF_VALUE: 24
PIF_VALUE: 26
PIF_VALUE: 12
PIF_VALUE: 3
PIF_VALUE: 26
PIF_VALUE: 10
PIF_VALUE: 35
PIF_VALUE: 22
PIF_VALUE: 26
PIF_VALUE: 10
PIF_VALUE: 26
PIF_VALUE: 27
PIF_VALUE: 23
PIF_VALUE: 26
PIF_VALUE: 22
PIF_VALUE: 26
PIF_VALUE: 17
PIF_VALUE: 22
PIF_VALUE: 22
PIF_VALUE: 26
PIF_VALUE: 23
PIF_VALUE: 22
PIF_VALUE: 10
PIF_VALUE: 26
PIF_VALUE: 17
PIF_VALUE: 26
PIF_VALUE: 27
PIF_VALUE: 27
PIF_VALUE: 34
PIF_VALUE: 27
PIF_VALUE: 22
PIF_VALUE: 4
PIF_VALUE: 27
PIF_VALUE: 12
PIF_VALUE: 27
PIF_VALUE: 7
PIF_VALUE: 22
PIF_VALUE: 20
PIF_VALUE: 29
PIF_VALUE: 10
PIF_VALUE: 26
PIF_VALUE: 21
PIF_VALUE: 26
PIF_VALUE: 16
PIF_VALUE: 26
PIF_VALUE: 16
PIF_VALUE: 12
PIF_VALUE: 26
PIF_VALUE: 26
PIF_VALUE: 28
PIF_VALUE: 11
PIF_VALUE: 17
PIF_VALUE: 28
PIF_VALUE: 11
PIF_VALUE: 22
PIF_VALUE: 11
PIF_VALUE: 23
PIF_VALUE: 26
PIF_VALUE: 14
PIF_VALUE: 17
PIF_VALUE: 26
PIF_VALUE: 26
PIF_VALUE: 27
PIF_VALUE: 17
PIF_VALUE: 26
PIF_VALUE: 16
PIF_VALUE: 17
PIF_VALUE: 26
PIF_VALUE: 4
PIF_VALUE: 22
PIF_VALUE: 16
PIF_VALUE: 26
PIF_VALUE: 33
PIF_VALUE: 27
PIF_VALUE: 22
PIF_VALUE: 25
PIF_VALUE: 26
PIF_VALUE: 27
PIF_VALUE: 27
PIF_VALUE: 26
PIF_VALUE: 27
PIF_VALUE: 27
PIF_VALUE: 28
PIF_VALUE: 3
PIF_VALUE: 27
PIF_VALUE: 14
PIF_VALUE: 26
PIF_VALUE: 1
PIF_VALUE: 26
PIF_VALUE: 27
PIF_VALUE: 21
PIF_VALUE: 27
PIF_VALUE: 21
PIF_VALUE: 5
PIF_VALUE: 11
PIF_VALUE: 1
PIF_VALUE: 17
PIF_VALUE: 12
PIF_VALUE: 26
PIF_VALUE: 21
PIF_VALUE: 26
PIF_VALUE: 26
PIF_VALUE: 23
PIF_VALUE: 26
PIF_VALUE: 3
PIF_VALUE: 3
PIF_VALUE: 21
PIF_VALUE: 22
PIF_VALUE: 26
PIF_VALUE: 21
PIF_VALUE: 29
PIF_VALUE: 1
PIF_VALUE: 26
PIF_VALUE: 26
PIF_VALUE: 2
PIF_VALUE: 26
PIF_VALUE: 22
PIF_VALUE: 23
PIF_VALUE: 28
PIF_VALUE: 26
PIF_VALUE: 26
PIF_VALUE: 28
PIF_VALUE: 18
PIF_VALUE: 32
PIF_VALUE: 24
PIF_VALUE: 21
PIF_VALUE: 3
PIF_VALUE: 26
PIF_VALUE: 16
PIF_VALUE: 26
PIF_VALUE: 13
PIF_VALUE: 17
PIF_VALUE: 10
PIF_VALUE: 17
PIF_VALUE: 11
PIF_VALUE: 14
PIF_VALUE: 3
PIF_VALUE: 26
PIF_VALUE: 22
PIF_VALUE: 16
PIF_VALUE: 22
PIF_VALUE: 26
PIF_VALUE: 26
PIF_VALUE: 3
PIF_VALUE: 10
PIF_VALUE: 3
PIF_VALUE: 34
PIF_VALUE: 10
PIF_VALUE: 26
PIF_VALUE: 25
PIF_VALUE: 21
PIF_VALUE: 26
PIF_VALUE: 26
PIF_VALUE: 2
PIF_VALUE: 21
PIF_VALUE: 18
PIF_VALUE: 26
PIF_VALUE: 5
PIF_VALUE: 9
PIF_VALUE: 17
PIF_VALUE: 10
PIF_VALUE: 5
PIF_VALUE: 26
PIF_VALUE: 1
PIF_VALUE: 10
PIF_VALUE: 2
PIF_VALUE: 12
PIF_VALUE: 27
PIF_VALUE: 26
PIF_VALUE: 23
PIF_VALUE: 27
PIF_VALUE: 4
PIF_VALUE: 26
PIF_VALUE: 17
PIF_VALUE: 26
PIF_VALUE: 27
PIF_VALUE: 11
PIF_VALUE: 4
PIF_VALUE: 4
PIF_VALUE: 26
PIF_VALUE: 34
PIF_VALUE: 27
PIF_VALUE: 26
PIF_VALUE: 2
PIF_VALUE: 17
PIF_VALUE: 22
PIF_VALUE: 2
PIF_VALUE: 26
PIF_VALUE: 26
PIF_VALUE: 1
PIF_VALUE: 26
PIF_VALUE: 1
PIF_VALUE: 16
PIF_VALUE: 21
PIF_VALUE: 13
PIF_VALUE: 26
PIF_VALUE: 22
PIF_VALUE: 26
PIF_VALUE: 29
PIF_VALUE: 27
PIF_VALUE: 14
PIF_VALUE: 12
PIF_VALUE: 26
PIF_VALUE: 4
PIF_VALUE: 3
PIF_VALUE: 26
PIF_VALUE: 27
PIF_VALUE: 26
PIF_VALUE: 22
PIF_VALUE: 26
PIF_VALUE: 17
PIF_VALUE: 23
PIF_VALUE: 3
PIF_VALUE: 11

## 2022-02-10 ASSESSMENT — PAIN DESCRIPTION - DESCRIPTORS
DESCRIPTORS: ACHING

## 2022-02-10 ASSESSMENT — PAIN DESCRIPTION - LOCATION
LOCATION: FOOT

## 2022-02-10 ASSESSMENT — PAIN DESCRIPTION - PROGRESSION
CLINICAL_PROGRESSION: GRADUALLY WORSENING
CLINICAL_PROGRESSION: GRADUALLY WORSENING

## 2022-02-10 ASSESSMENT — PAIN DESCRIPTION - PAIN TYPE
TYPE: SURGICAL PAIN

## 2022-02-10 ASSESSMENT — PAIN DESCRIPTION - ONSET
ONSET: GRADUAL
ONSET: GRADUAL

## 2022-02-10 ASSESSMENT — PAIN SCALES - GENERAL
PAINLEVEL_OUTOF10: 9
PAINLEVEL_OUTOF10: 10
PAINLEVEL_OUTOF10: 8
PAINLEVEL_OUTOF10: 0
PAINLEVEL_OUTOF10: 9
PAINLEVEL_OUTOF10: 0
PAINLEVEL_OUTOF10: 10
PAINLEVEL_OUTOF10: 9
PAINLEVEL_OUTOF10: 9
PAINLEVEL_OUTOF10: 10
PAINLEVEL_OUTOF10: 10
PAINLEVEL_OUTOF10: 0
PAINLEVEL_OUTOF10: 9

## 2022-02-10 ASSESSMENT — PAIN DESCRIPTION - ORIENTATION
ORIENTATION: LEFT

## 2022-02-10 ASSESSMENT — PAIN DESCRIPTION - FREQUENCY
FREQUENCY: CONTINUOUS

## 2022-02-10 ASSESSMENT — PAIN - FUNCTIONAL ASSESSMENT: PAIN_FUNCTIONAL_ASSESSMENT: 0-10

## 2022-02-10 NOTE — PROGRESS NOTES
Patient quiet and dozing off and on. States still having pain. Explained /70. Don't want BP to get any lower since going home. Safe doses of pain meds given. Explained after second dose of Norco at 9:30pm, if pain is not bearable, to notify surgeon for next step. States understands.

## 2022-02-10 NOTE — ANESTHESIA POSTPROCEDURE EVALUATION
Department of Anesthesiology  Postprocedure Note    Patient: Malissa Staley  MRN: [id number]  YOB: 1970  Date of evaluation: 2/10/2022  Time:  5:07 PM     Procedure Summary     Date: 02/10/22 Room / Location: [address]    Anesthesia Start: 6254 Anesthesia Stop: 7267    Procedures:       LEFT SUBTALAR JOINT FUSION; TALONAVICULAR JOINT ARTHRODESIS (24624, 74444) - BLOCK NON-CONTINUOUS (Left )      LEFT GASTROCNEMIUS RECESSION, LEFT [facility] PROCEDURE (Left Leg Lower)      LEFT COTTON OSTEOTOMY;  (36977, 10651) (Left Foot) Diagnosis:       (M19.272  OSTEOARTHRITIS-LEFT FOOT & ANKLE)      (M66.872 SPONTANEOUS POSTERIOR TIBIAL TENDON RUPTURE -LEFT ANKLE)      (M67.2  SHORT ACHILLES TENDON/EQUINUS-LEFT ANKLE)      (Q66.42  CALCANEAL VALGUS)      (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        Anesthesia Post Evaluation    Patient location during evaluation: PACU  Patient participation: complete - patient participated  Level of consciousness: awake  Airway patency: patent  Nausea & Vomiting: no vomiting  Complications: no  Cardiovascular status: hemodynamically stable  Respiratory status: acceptable  Hydration status: euvolemic  Multimodal analgesia pain management approach

## 2022-02-10 NOTE — PROGRESS NOTES
Pt awake and crying, stating that she is having severe pain. Xray at bedside to complete order. Pt medicated per order.

## 2022-02-10 NOTE — PROGRESS NOTES
Explained to patient, may take additional doses of oral pain med to help with aching discomfort. Encouraged fluids. Able to eat crackers and drink some fluids. Explained by Sirena Pompa safe dose of several pain meds given. Can repeat oral pain tab at home at 9:30pm.  Sirena Pompa RN made anesthesia (Dr. Jacky Vanegas) aware.

## 2022-02-10 NOTE — PROGRESS NOTES
Discharge instructions reviewed with oh (sister) via telephone, and understanding verbalized per pt/family. Scripts given to pt prior to surgery date.

## 2022-02-10 NOTE — ANESTHESIA PRE PROCEDURE
Department of Anesthesiology  Preprocedure Note       Name:  Jacki Roland   Age:  46 y.o.  :  1970                                          MRN:  [id number]         Date:  2/10/2022      Surgeon: Tone Morris):  Shannan Rouse DPM    Procedure: Procedure(s):  LEFT SUBTALAR JOINT FUSION; TALONAVICULAR JOINT ARTHRODESIS (80712, 98078) - BLOCK NON-CONTINUOUS  LEFT GASTROCNEMIUS RECESSION  LEFT COTTON OSTEOTOMY; [facility] (53385, 88299)  LEFT POSTERIOR IBIAL TENDON REPAIR WITH GRAFT; ACQUISITION TENDON GRAFT WITH REROUTING; APPLICATION BELOW KNEE SPLINT    Medications prior to admission:   Prior to Admission medications    Medication Sig Start Date End Date Taking?  Authorizing Provider   vitamin D (ERGOCALCIFEROL) 1.25 MG (02461 UT) CAPS capsule Take 50,000 Units by mouth once a week   Yes Historical Provider, MD   ferrous sulfate 325 (65 FE) MG tablet Take 325 mg by mouth daily (with breakfast)   Yes Historical Provider, MD   albuterol sulfate HFA (PROVENTIL HFA) 108 (90 Base) MCG/ACT inhaler Inhale 2 puffs into the lungs every 6 hours as needed for Wheezing 18   Dali Brown MD       Current medications:    Current Facility-Administered Medications   Medication Dose Route Frequency Provider Last Rate Last Admin    HYDROmorphone (DILAUDID) injection 0.25 mg  0.25 mg IntraVENous Q5 Min PRN Trevor Ocampo MD        HYDROmorphone (DILAUDID) injection 0.5 mg  0.5 mg IntraVENous Q5 Min PRN Trevor Ocampo MD        fentaNYL (SUBLIMAZE) injection 25 mcg  25 mcg IntraVENous Q5 Min PRN Trevor Ocampo MD        fentaNYL (SUBLIMAZE) injection 50 mcg  50 mcg IntraVENous Q5 Min PRN Trevor Ocampo MD        HYDROcodone-acetaminophen (NORCO) 5-325 MG per tablet 1 tablet  1 tablet Oral Once PRN Trevor Ocampo MD        ondansetron [facility]) injection 4 mg  4 mg IntraVENous Once PRN Trevor Ocampo MD        promethazine (PHENERGAN) injection 6.25 mg  6.25 mg IntraVENous Once PRN Army Vishal MD         Current Outpatient Medications   Medication Sig Dispense Refill    vitamin D (ERGOCALCIFEROL) 1.25 MG (37910 UT) CAPS capsule Take 50,000 Units by mouth once a week      ferrous sulfate 325 (65 FE) MG tablet Take 325 mg by mouth daily (with breakfast)      albuterol sulfate HFA (PROVENTIL HFA) 108 (90 Base) MCG/ACT inhaler Inhale 2 puffs into the lungs every 6 hours as needed for Wheezing 1 Inhaler 0       Allergies: Allergies   Allergen Reactions    Latex Itching     itching and reddness    Codeine Photosensitivity and Other (See Comments)     Disoriented  Pt. States blacked out and lost vision for a short period of time when last taken.   \"blacks out\"       Problem List:    Patient Active Problem List   Diagnosis Code    Pain in both lower extremities M79.604, M79.605    Varicose veins of both lower extremities with pain I83.813    Spider veins of both lower extremities I83.93    Cerebrovascular accident (CVA) ([facility] [address] I63.9    History of blood clots Z86.718    Hypercoagulable state ([facility] [address] D68.59    Heterozygous MTHFR mutation [id number] Z15.89       Past Medical History:        Diagnosis Date    Anemia     Chipped tooth     Clotting disorder ([facility] [address]     Depression     Fibroid     Hx of blood clots     Rt leg lovenox but no long term RX at     Latex allergy     Prolonged emergence from general anesthesia     Seizure disorder ([facility] [address]     last seizure Tues 9/7/21    STD (sexually transmitted disease)        Past Surgical History:        Procedure Laterality Date    ABLATION SAPHENOUS VEIN W/ RFA Left 10/19/2020    LEFT LOWER EXTREMITY VNUS RADIOFREQUENCY ABLATION OF THE GREATER SAPHENOUS VEIN WITH STAB PHLEBECTOMIES AND LIGATION LESSER SAPHENOUS VEIN performed by Emre Jasso MD at [address]  01/20/2022    FINGER SURGERY      HYSTEROSCOPY      TUBAL LIGATION         Social History:    Social History Tobacco Use    Smoking status: Never Smoker    Smokeless tobacco: Never Used   Substance Use Topics    Alcohol use: No     Alcohol/week: 0.0 standard drinks                                Counseling given: Not Answered      Vital Signs (Current):   Vitals:    02/08/22 1041   Weight: 258 lb (117 kg)   Height: 5' 5\" (1.651 m)                                              BP Readings from Last 3 Encounters:   09/09/21 128/86   11/03/20 (!) 135/93   10/21/20 124/76       NPO Status:                                                                                 BMI:   Wt Readings from Last 3 Encounters:   02/08/22 258 lb (117 kg)   09/09/21 265 lb (120.2 kg)   11/03/20 260 lb (117.9 kg)     Body mass index is 42.93 kg/m². CBC:   Lab Results   Component Value Date    WBC 5.1 01/01/2018    RBC 4.33 01/01/2018    HGB 10.7 01/01/2018    HCT 33.5 01/01/2018    MCV 77.4 01/01/2018    RDW 15.4 01/01/2018     01/01/2018       CMP:   Lab Results   Component Value Date     01/01/2018    K 3.9 01/01/2018     01/01/2018    CO2 27 01/01/2018    BUN 10 01/01/2018    CREATININE 0.5 01/01/2018    GFRAA >60 01/01/2018    AGRATIO 1.0 01/01/2018    LABGLOM >60 01/01/2018    GLUCOSE 83 01/01/2018    PROT 7.4 01/01/2018    CALCIUM 8.7 01/01/2018    BILITOT <0.2 01/01/2018    ALKPHOS 70 01/01/2018    AST 18 01/01/2018    ALT 12 01/01/2018       POC Tests: No results for input(s): POCGLU, POCNA, POCK, POCCL, POCBUN, POCHEMO, POCHCT in the last 72 hours.     Coags:   Lab Results   Component Value Date    PROTIME 11.3 10/18/2017    INR 1.00 10/18/2017    APTT 29.4 10/18/2017       HCG (If Applicable):   Lab Results   Component Value Date    PREGTESTUR Negative 09/09/2021        ABGs: No results found for: PHART, PO2ART, LWD4WLI, SWC8FAY, BEART, L0ARCRFT     Type & Screen (If Applicable):  No results found for: LABABO, LABRH    Drug/Infectious Status (If Applicable):  No results found for: HIV, HEPCAB    COVID-19 Screening (If Applicable):   Lab Results   Component Value Date    COVID19 NOT DETECTED 10/14/2020           Anesthesia Evaluation  Patient summary reviewed and Nursing notes reviewed history of anesthetic complications (prolonged emergence from [place]): Airway: Mallampati: II  TM distance: >3 FB   Neck ROM: full  Mouth opening: > = 3 FB Dental: normal exam         Pulmonary:Negative Pulmonary ROS breath sounds clear to auscultation                            ROS comment: Last used albuterol a couple of years ago with bronchitis   Cardiovascular:        (-) CABG/stent, dysrhythmias and  angina      Rhythm: regular  Rate: normal                    Neuro/Psych:   (+) seizures (last seizure 9/2021 -stares off into space and cannot answer.):, CVA:, psychiatric history:             ROS comment: No longer on anti seizure meds per neurologist.  Discharged from his care. GI/Hepatic/Renal:        (-) GERD       Endo/Other:                     Abdominal:   (+) obese,           Vascular:   + DVT, .  - PE.       ROS comment: Not on anticoagulation. Other Findings:             Anesthesia Plan      general and regional     ASA 3     (Risks of regional nerve block discussed include failed or partial block, infection, temporary or permanent nerve damage and intravascular injection. Patient verbalizes understanding and her wishes to proceed with planned anesthetic.  )  Induction: intravenous. MIPS: Postoperative opioids intended and Prophylactic antiemetics administered. Anesthetic plan and risks discussed with patient. Plan discussed with CRNA.                 Pari Draper MD   2/10/2022

## 2022-02-10 NOTE — PROGRESS NOTES
Pt arrived from OR, report from crna/rn. Pt had left foot surgery, left lower leg ace wrap/ splint in place upon arrival to pacu. Left toes warm, appropriate color. Pt responsive to pain, respirations even unlabored, simple mask 6 liters in place. Left leg elevated and ice pack placed.

## 2022-02-10 NOTE — PROGRESS NOTES
Preop block done. See anesthesia record. No distress noted. Patient tolerated procedure with no difficulty.

## 2022-02-10 NOTE — OP NOTE
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    Post-Op Diagnosis: Same       Procedure(s):  Subtalar joint fusion [53352], talonavicular joint arthrodesis [77141], gastrocnemius recession [36602], cotton osteotomy [ 49901], application below knee splint [72742], kidner procedure [35330]     Surgeon(s):  Mis Quiles DPM     Assistant(s): Mainsh Herrera PGY3     Anesthesia: general     Injectables: pre-op popliteal and saphenous block, pre-op 10 cc 1% Lidocaine with epinephrine and post-op 10 cc 0.5% Marcaine plain      Hemostasis: anatomic dissection and electrocautery     Materials: 3-0 vicryl, 4-0 vicryl, 5-0 vicryl       Estimated Blood Loss: 448      Complications: None     Specimens:   * No specimens in log *    Implants:  Implant Name Type Inv.  Item Serial No.  Lot No. LRB No. Used Action   GRAFT BNE SUB 5CC B TRICALCIUM PHSPTE VERSATILE ULT POR - [id number]  GRAFT BNE SUB 5CC B TRICALCIUM PHSPTE VERSATILE ULT POR  [employer]- [id number] Left 1 Implanted   [employer] AUG[employer] INJECTABLE 3C     [id number] Left 1 Implanted   GRAFT BONE TIB INJ 1.5CC ALLOGRFT AUGMENT - [id number]  GRAFT BONE TIB INJ 1.5CC ALLOGRFT AUGMENT  [facility] [employer]- [id number] Left 1 Implanted   [employer] AUGMENT INJECTABLE 3CC     [id number] Left 1 Implanted   [employer] AUGMENT INJECTABLE 3CC     [id number] Left 1 Implanted   SCREW BNE BLANCHE 6.5X85 MM 16 MM THRD AXSOS 3 - [id number]  SCREW BNE BLANCHE 6.5X85 MM 16 MM THRD AXSOS 3  [employer] [facility]  Left 1 Implanted   PLATE BNE LNG MIDFOOT CROSSPLT POLYAX MARIXA T10 ANCHORAGE 2 - [id number]  PLATE BNE LNG MIDFOOT CROSSPLT POLYAX MARIXA T10 ANCHORAGE 2  [employer] [facility]  Left 1 Implanted   SCREW BNE LAG 4.1X70 MM CROSS PLATE V22 DRV NS ANCHORAGE - [id number]  SCREW BNE LAG 4.1X70 MM CROSS PLATE W79 DRV NS ANCHORAGE  [employer] [facility]  Left 1 Implanted   SCREW BNE L50MM DIA3. 5MM MARIXA FULL THRD T8 DRV VARIAX - [id number]  SCREW BNE L50MM DIA3. 5MM MARIXA FULL THRD T8 DRV VARIAX  [employer] [facility]  Left 1 Implanted   SCREW BNE L50MM DIA3. 5MM MARIXA FULL THRD T8 DRV VARIAX - [id number]  SCREW BNE L50MM DIA3. 5MM MARIXA FULL THRD T8 DRV VARIAX  [employer] [facility]  Left 1 Implanted   SCREW BNE L26MM DIA3.5MM CANC TI MARIXA FULL THRD T10 DRV FOR - [id number]  SCREW BNE L26MM DIA3.5MM CANC TI MARIXA FULL THRD T10 DRV FOR  [employer] [facility]  Left 1 Implanted   SCREW BNE L20MM DIA3. 5MM TI ALLY MARIXA FULL THRD T10 DRV - [id number]  SCREW BNE L20MM DIA3. 5MM TI ALLY MARIXA FULL THRD T10 DRV  [employer] [facility]  Left 1 Implanted   SCREW BNE L16MM DIA3.5MM MIRIAM TI MARIXA FULL THRD T10 DRV FOR - [id number]  SCREW BNE L16MM DIA3.5MM MIRIAM TI MARIXA FULL THRD T10 DRV FOR  [employer] [facility]  Left 1 Implanted   GRAFT HUM TISS [id number] WD FOR [place] - [id number]  GRAFT HUM TISS [id number] [employer] Policy [employer] FOR [employer]ORE  GEN[employer] [facility] [id number] Left 1 Implanted   STAPLE BNE FIX [id number] THK1.8X1.3MM ANK FT SUPERELASTIC - [id number]  STAPLE BNE FIX [id number] THK1.8X1.3MM ANK FT SUPERELASTIC  [employer] [facility] [id number] Left 1 Implanted   STAPLE INT FIX [id number] THK2.5X1.6MM ANK FT SUP E EASYCLIP - [id number]  STAPLE INT FIX [id number] THK2.5X1.6MM ANK FT SUP E EASYCLIP  [employer] [facility]-WD [id number] Left 1 Implanted   ANCHOR KIT 2.5X10 MM FORC FIBER SONIC - [id number]  ANCHOR KIT 2.5X10 MM FORC FIBER SONIC  GENAR[employer] [facility]-WD [id number] Left 2 Implanted         Drains: * No LDAs found *       INDICATIONS FOR PROCEDURE: This patient has signs and symptoms clinically and radiographically consistent with the above mentioned preoperative diagnosis. Having failed conservative treatment, it was determined that the patient would benefit from surgical intervention. All potential risks, benefits, and complications were discussed with the patient prior to the scheduling of surgery. All the patient's questions were answered and no guarantees were given. The patient wished to proceed with surgery, and informed written consent was obtained. DETAILS OF PROCEDURE: In the pre-operative area, the patient received a regional popliteal block to the left lower extremity performed by the anesthesiologist. The patient was then brought from the pre-operative area and placed on the operating table in the supine position. A pneumatic thigh tourniquet was placed around the patient's well-padded left lower extremity. At this time, a skin marker was used to outline incisions over the medial aspect of the left foot and calf. Following IV sedation, 10 cc of 1% Lidocaine with epinephrine was injected subdermally along the planned out incision. The left lower extremity was then scrubbed, prepped, and draped in the usual sterile fashion. A time-out was performed. The patient, procedure, and operative site were confirmed and the following procedure was performed. Procedure #1 gastrocnemius recession, left [51697]: Attention was then directed towards the medial aspect of the left calf. To identify the junction of the gastrocnemius muscle and the soleus muscle, the foot was gently dorsiflexed and plantarflexed. After identifying this location at the medial aspect of the left calf, a #15 blade was used to make an approximately 4 cm skin incision. The incision was then deepened through the subcutaneous tissues to the level of the deep fascia using a combination of small metzenbaum scissor and blunt dissection. Care was taken to retract and protect all vital neurovascular structures in the subcutaneous tissues. All bleeders were electrocauterized using the bovie. Next, the deep fascia was incised along the length of the skin incision. Using blunt finger dissection, the intermuscular separation between the gastrocnemius and soleus muscle was identified. A small pediatric speculum was then placed into this separation of the two muscles and locked into place. Great visualization of the gastrocnemius muscle fascia and the soleus muscle fascia was observed. Next, the foot was maximally dorsiflexed and using a #15 blade attached to a long blade handle, the gastrocnemius muscle fascia was transected in the proximal aspect of the incision from lateral to medial with special care taken to avoid the sural nerve. At this time, the amount of ankle joint dorsiflexion was noted and observed to be greatly improved from pre-operative examination. At the surgical site, there also was great lengthening noted in both the gastrocnemius muscle fascia.      The surgical site was then irrigated with copious amounts of normal saline. The deep fascia was re-approximated using 3-0 vicryl. The subcutaneous tissues were then reapproximated using 4-0 vicryl. The skin was closed using 5-0 vicryl in a running intradermal suture fashion. Mastisol and steristrips were placed overtop the incision. The incision was painted with betadine and covered with gauze and tegaderm.     Procedure #2, #3, and #4 subtalar joint fusion [81132], talonavicular arthrodesis [08798] and kidner procedure, left [02024]: Attention was then directed towards the medial aspect of the left foot. A #15 blade was used to make a skin incision from the anterior aspect of the medial malleolus distally to just distal to the talonavicular joint. The incision was deepened through the subcutaneous tissues down to the deep fascia using a combination of sharp and blunt dissection. All bleeders were electrocauterized and ligated as encountered.  Care was taken to protect all vital neurovascular structures including the saphenous vein in the subcutaneous tissues.      Next, the posterior tibial tendon sheath was incised and the tendon exposed and inspected. It was noted to be thickened and fibrotic, but intact. The posterior tibial tendon was then retracted and protected and the deep fascia/capsule overlying the talonavicular joint was incised. The incision of this deep layer was continued proximally to expose the subtalar joint. The subtalar joint was then freed from its capsular tissues and mobilized. Care was taken to protect the calcaneal fibular ligament at this location. Next, an osteotome was used to open the subtalar joint. After gaining access the to the subtalar joint, the joint distractor was placed into the joint to further expose the joint. A #15 blade was used to resect the interosseous talocalcaneal ligament and the cervical ligaments to gain better visualization. The cartilage on the posterior, anterior, and middle facets of the calcaneus and talus were removed down to the level of subchondral bone using a combination of osteotomes, curettes, and rongeurs. After removing all cartilage, the surgical site was irrigated with copious amounts of saline and the subchondral plates penetrated using awl picks and osteotomes. To augment the fusion site and promote bony union, Анна Vitoss and Augment was placed into the arthrodesis site.      Attention was then directed towards the talonavicular joint where the deep fascia/capsular layer was incised and reflected to expose the joint. Care was taken to protect the anterior tibial tendon. A joint distractor was then used to further expose the joint. Next, the cartilage on the talar head and the navicular was removed down to the level of subchondral bone using a combination of osteotomes, curettes, and rongeurs. Care was taken to retain the natural ball and socket nature of the joint.   After removing all cartilage, the surgical site was irrigated with copious amounts of saline and the subchondral plates penetrated using awl picks and osteotomes. To augment the fusion site and promote bony union, Vitoss and Augment was placed into the arthrodesis site.      Next, the subtalar joint was positioned in a neutral position and temporarily fixated with a guide wire thrown from the posterior inferior calcaneus to the talar body. C-arm was utilized to confirm adequate placement. A stab incision was made over the guide wire to allow the head of the screw to pass the skin. Once confirmed, permanent fixation was achieved with a 6.5 x 85 mm cannulated screw using modified AO technique and the 's recommendations. The temporary fixation was removed and C-arm was used to visualize good compression at the posterior aspect of the subtalar joint. Using 3-0 nylon, the stab incision was closed in a simple interrupted suture fashion. Next, the talonavicular joint was reduced and temporarily fixated. Proper reduction was confirmed using c-arm. The talonavicular articulation was noted to be excellent on the AP image of the foot with excellent correction of the talonavicular uncovering. The talar declination angle was noted to be improved and in excellent alignment with the first metatarsal. Satisfied with this correction, the talonavicular joint was fixated with a midfoot cp long plate, locking screws, and a 4.1 x 70 mm lag screw from [email or web address]. Proper position of fixation and confirmation of correction was obtained using live intraoperative c-arm. Next, the surgical site was irrigated with copious amounts of normal saline.      The posterior tibial tendon was then identified and sharply dissected from the navicular from dorsal to plantar using a #15 blade. This tendon was carefully and minimally reflected and freed from the underlying bone at the navicular tuberosity.  After obtaining satisfactory dissection attention was then directed towards reattachment of the posterior tibial tendon.      The posterior tibial tendon was then evaluated and it was noted to have approximately 1 cm of redundancy in the posterior tibial tendon. A [employer] suture anchor was then inserted into the navicular from plantar to dorsal. The first fiberwire suture in the anchor was inserted into the posterior tibial tendon approximately 1 cm from its insertion. The next suture anchor was then inserted just proximal to the first one. Next, the foot was slightly inverted and advanced distally and tied down. The other suture material was then tied to keep the tendon in place. Procedure #5 cotton osteotomy, left: The foot was then assessed using simulated weightbearing. It was noted that there was a forefoot supinatus component to the flatfoot deformity that needed to be corrected. Attention was then directed towards the dorsal aspect of the 1st metatarsal cuneiform joint. Next, a #15 blade was used to extend the original incision over the dorsal aspect of the medial cuneiform. The incision was carried down through the subcutaneous tissues using a combination of sharp and blunt dissection. Special care was taken to identify and retract all vital neurovascular structures in the subcutaneous tissues. All bleeders were electrocuaterized as encountered. Prior to deep capsular incision, the middle of the medial cuneiform was identified and confirmed using c-arm. Using a #15 blade, the deep fascia of the medial cuneiform was incised from medial to lateral. This was done to give assess to the underlying bone for the cuneiform osteotomy. Care was taken to not reflect too much soft tissue and to preserve the blood supply to the bone. Next, a sagittal saw was used to make a through and through osteotomy cut from lateral to medial. Care was taken to protect all neurovascular structures. An osteotome and mallet was then used to complete the osteotomy plantarly. The osteotomy was then stretched open using a mini distractor.  Trial sized implants were then inserted into the osteotomy site to obtain the desired correction and reduction of the supinatus deformity in the flatfoot condition. We decided that an 1p70m11 mm sized [employer] kaufman wedge Implant was appropriate. The Cotton wedge was then soaked in Augment and placed into the site. Next, a 20mm x 20mm 20 Анна Medical easy clip staple was placed dorsally over the cotton osteotomy and wedge. Proper position of the staple and wedge was confirmed using c-arm and it was confirmed that the staple did not enter the navicular cuneiform joint or the metatarsal cuneiform joint. Next, a [id number] mm [employer] easy clip staple was placed medially over the cotton osteotomy and wedge. Proper position of the stable lead was confirmed using C-arm and it was confirmed a stable did not enter the navicular cuneiform or the metatarsal cuneiform joint. The surgical wound was then irrigated using copious amounts of normal saline and then attention was then directed towards closure. The deep fascia and capsular layer overlying the metatarsal cuneiform joint was re-approximated using 3-0 vicryl in a continuous running suture technique. The capsule covering the subtalar and talonavicular joints was then closed using 3-0 vicryl. The posterior tibial tendon was then placed sheath was then closed using 3-0 vicryl.     Next, 3-0 vicryl is used to close the deep subcutaneous tissues. The 4-0 vicryl was used to close the more superficial and 5-0 vicryl is used to close the skin in a running intradermal technique with the free ends of the 4-0 and 5-0 vicryl being tied outside the skin at either end of the incision. A soft sterile dressing was applied consisting of mastisol, steristrips, betadine paint, gauze, and cast padding. A prompt hyperemic response was noted on all aspects of the patient's left lower extremity.     At this time, a local anesthetic was injected about the incision sites consisting of 10 cc 0.5% Waldo jimenez for the patient's postoperative comfort. A soft sterile dressing was applied consisting of mastisol, steristrips, betadine paint, gauze, cast padding, and a modified gutierres compression. A prompt hyperemic response was noted on all aspects of the patient's left lower extremity. Procedure #6 application below knee splint, left:  Next, copious amounts of cast padding was applied to the patient's left lower extremity from the metatarsal heads to approximately 3 finger breaths distal to the head and neck of the fibula. Next, using moistened padded splint material, a posterior splint was applied from the plantar foot posteriorly up the leg to approximately the midcalf area. ACE compression was then applied from distal to proximal to secure the posterior splint in place and provide compression to prevent post-operative edema. At this time, the foot was then placed at 90 degrees/neutral to prevent acquired equinus deformity and relieve tension on the surgical repair. END OF PROCEDURE: The patient tolerated the procedure and anesthesia well and was transported from the operating room to the PACU with vital signs stable and vascular status intact to all aspects of the patient's left lower extremity and digital capillary refill time immediate to the digits of the left foot. Following a period of post-operative monitoring, the patient will be discharged home with written and oral wound care and follow-up instructions. The patient was provided with prescriptions pre-operatively in Dr. Prakash Gerber private office. The patient is to follow-up with Dr. Alysa Joe in his private office within 5-7 days. The patient is to keep dressing clean, dry and intact at all times. The patient is to call if any complications occur.     This operative report was dictated on behalf of Dr. Maulik Patel DPM  Podiatric Resident PGY3  Pager [phone] or [employer]  2/11/2022, 10:33 AM      Electronically signed by Bradford Hua DPM on 2/10/2022 at 5:02 PM

## 2022-02-10 NOTE — BRIEF OP NOTE
Brief Postoperative Note      Patient: Ping Meyers  YOB: 1970  MRN: [id number]    Date of Procedure: 2/10/2022    Pre-Op Diagnosis: M19.272  OSTEOARTHRITIS-LEFT FOOT & ANKLE  C90.119 SPONTANEOUS POSTERIOR TIBIAL TENDON RUPTURE - LEFT FOOT & ANKLE  M67.2  SHORT ACHILLES TENDON/EQUINUS-LEFT ANKLE  Q66.42  CALCANEAL VALGUS  N63.352  CONGENITAL METATARSIS PRIMUS VARUS-LEFT FOOT    Post-Op Diagnosis: Same     Procedure(s):  Subtalar joint fusion [46948], talonavicular joint arthrodesis [03281], gastrocnemius recession [71077], cotton osteotomy [ 27672], application below knee splint [54475], kidner procedure [81635]    Surgeon(s):  Collette Parisian, DPM    Assistant(s): Sebas Jordan PGY3    Anesthesia: general    Injectables: pre-op popliteal and saphenous block, pre-op 10 cc 1% Lidocaine with epinephrine and post-op 10 cc 0.5% Marcaine plain     Hemostasis: anatomic dissection and electrocautery    Materials: 3-0 vicryl, 4-0 vicryl, 5-0 vicryl      Estimated Blood Loss: 254     Complications: None    Specimens:   * No specimens in log *    Implants:  Implant Name Type Inv.  Item Serial No.  Lot No. LRB No. Used Action   GRAFT BNE SUB 5CC B TRICALCIUM PHSPTE VERSATILE ULT POR - [id number]  GRAFT BNE SUB 5CC B TRICALCIUM PHSPTE VERSATILE ULT POR  [employer]- [id number] Left 1 Implanted   [employer] AUGMENT INJECTABLE 3CC     [id number] Left 1 Implanted   GRAFT BONE TIB INJ 1.5CC ALLOGRFT AUGMENT - [id number]  GRAFT BONE TIB INJ 1.5CC ALLOGRFT AUGMENT  [facility] [employer]- [id number] Left 1 Implanted   [employer] MEDICAL AUGMENT INJECTABLE 3CC     [id number] Left 1 Implanted   [employer] AUGMENT INJECTABLE 3CC     [id number] Left 1 Implanted   SCREW BNE BLANCHE 6.5X85 MM 16 MM THRD AXSOS 3 - [id number]  SCREW BNE BLANCHE 6.5X85 MM 16 MM THRD AXSOS 3  [employer] [facility]  Left 1 Implanted   PLATE BNE LNG MIDFOOT CROSSPLT POLYAX MARIXA T10 ANCHORAGE 2 - [id number]  PLATE BNE LNG MIDFOOT CROSSPLT POLYAX MARIXA T10 ANCHORAGE 2  [employer] [facility]  Left 1 Implanted   SCREW BNE LAG 4.1X70 MM CROSS PLATE P60 DRV NS ANCHORAGE - [id number]  SCREW BNE LAG 4.1X70 MM CROSS PLATE K40 DRV NS ANCHORAGE  [employer] [facility]  Left 1 Implanted   SCREW BNE L50MM DIA3. 5MM MARIXA FULL THRD T8 DRV VARIAX - [id number]  SCREW BNE L50MM DIA3. 5MM MARIXA FULL THRD T8 DRV VARIAX  [employer] [facility]  Left 1 Implanted   SCREW BNE L50MM DIA3. 5MM MARIXA FULL THRD T8 DRV VARIAX - [id number]  SCREW BNE L50MM DIA3. 5MM MARIXA FULL THRD T8 DRV VARIAX  [employer] [facility]  Left 1 Implanted   SCREW BNE L26MM DIA3.5MM CANC TI MARIXA FULL THRD T10 DRV FOR - [id number]  SCREW BNE L26MM DIA3.5MM CANC TI MARIXA FULL THRD T10 DRV FOR  [employer] [facility]  Left 1 Implanted   SCREW BNE L20MM DIA3. 5MM TI ALLY MARIXA FULL THRD T10 DRV - [id number]  SCREW BNE L20MM DIA3. 5MM TI ALLY MARIXA FULL THRD T10 DRV  [employer] [facility]  Left 1 Implanted   SCREW BNE L16MM DIA3.5MM MIRIAM TI MARIXA FULL THRD T10 DRV FOR - [id number]  SCREW BNE L16MM DIA3.5MM MIRIAM TI MARIXA FULL THRD T10 DRV FOR  [employer] [facility]  Left 1 Implanted   GRAFT HUM TISS [id number] WD FOR [employer] - [id number]  GRAFT HUM TISS [id number] [employer] FOR [employer]  [employer] [facility]- [id number] Left 1 Implanted   STAPLE BNE FIX [id number] THK1.8X1.3MM ANK FT SUPERELASTIC - [id number]  STAPLE BNE FIX [id number] THK1.8X1.3MM ANK FT SUPERELASTIC  [employer] [facility] [id number] Left 1 Implanted   STAPLE INT FIX [id number] THK2.5X1.6MM ANK FT SUP E EASYCLIP - [id number]  STAPLE INT FIX [id number] THK2.5X1.6MM ANK FT SUP E EASYCLIP  [employer] [facility]-WD [id number] Left 1 Implanted   ANCHOR KIT 2.5X10 MM FORC FIBER SONIC - [id number]  ANCHOR KIT 2.5X10 MM FORC FIBER SONIC  GEN[employer]O [facility]-WD [id number] Left 2 Implanted         Drains: * No LDAs found *    Findings: as expected, see operative note    Electronically signed by Ifrah Lieberman DPM on 2/10/2022 at 4:56 PM

## 2022-02-10 NOTE — PROGRESS NOTES
Patient resting quietly. Dozing off and on. VSS and good. /61. P100. SaO2 93%. Respiraitons easy at 18. Still states left foot pain remains. Patient had left lower extremity nerve BLOCK. Moved to PACU Phase 2 Care.

## 2024-05-06 RX ORDER — CYCLOBENZAPRINE HCL 10 MG
10 TABLET ORAL 3 TIMES DAILY PRN
COMMUNITY
Start: 2024-02-19

## 2024-05-06 NOTE — PROGRESS NOTES
Name_______________________________________Printed:____________________  Date and time of surgery____5/9/2024______0730______________Arrival Time:___0600_____________   1. The instructions given regarding when and if a patient needs to stop oral intake prior to surgery varies.Follow the specific instructions you were given                  __x_Nothing to eat or to drink after Midnight the night before.                   ____Carbo loading or instructions will be given to select patients-if you have been given those instructions -please do the following                           The evening before your surgery after dinner before midnight drink 40 ounces of gatorade.If you are diabetic use sugar free.  The morning of surgery drink 40 ounces of water.This needs to be finished 3 hours prior to your surgery start time.    2. Take the following pills with a small sip of water on the morning of surgery___________________________________________________                  Do not take blood pressure medications ending in pril or sartan the patrick prior to surgery or the morning of surgery. Dr Inman's patient are not to take any medications the AM of surgery.         3. Aspirin, Ibuprofen, Advil, Naproxen, Vitamin E and other Anti-inflammatory products and supplements should be stopped for 5 -7days before surgery or as directed by your physician.   4. Check with your Doctor regarding stopping Plavix, Coumadin,Eliquis, Lovenox,Effient,Pradaxa,Xarelto, Fragmin or other blood thinners and follow their instructions.   5. Do not smoke, and do not drink any alcoholic beverages 24 hours prior to surgery.  This includes NA Beer.Refrain from the usage of any recreational drugs.   6. You may brush your teeth and gargle the morning of surgery.  DO NOT SWALLOW WATER   7. You MUST make arrangements for a responsible adult to stay on site while you are here and take you home after your surgery. You will not be allowed to leave alone or drive

## 2024-05-09 ENCOUNTER — ANESTHESIA (OUTPATIENT)
Dept: OPERATING ROOM | Age: 54
End: 2024-05-09
Payer: COMMERCIAL

## 2024-05-09 ENCOUNTER — HOSPITAL ENCOUNTER (OUTPATIENT)
Age: 54
Setting detail: OUTPATIENT SURGERY
Discharge: HOME | End: 2024-05-09
Attending: PODIATRIST | Admitting: PODIATRIST
Payer: COMMERCIAL

## 2024-05-09 ENCOUNTER — ANESTHESIA EVENT (OUTPATIENT)
Dept: OPERATING ROOM | Age: 54
End: 2024-05-09
Payer: COMMERCIAL

## 2024-05-09 VITALS
OXYGEN SATURATION: 93 % | BODY MASS INDEX: 43.71 KG/M2 | WEIGHT: 256 LBS | SYSTOLIC BLOOD PRESSURE: 115 MMHG | DIASTOLIC BLOOD PRESSURE: 72 MMHG | TEMPERATURE: 98.2 F | HEART RATE: 67 BPM | RESPIRATION RATE: 16 BRPM | HEIGHT: 64 IN

## 2024-05-09 LAB
BACTERIA URNS QL MICRO: ABNORMAL /HPF
BILIRUB UR QL STRIP.AUTO: NEGATIVE
CLARITY UR: ABNORMAL
COLOR UR: YELLOW
EPI CELLS #/AREA URNS AUTO: 8 /HPF (ref 0–5)
GLUCOSE UR STRIP.AUTO-MCNC: NEGATIVE MG/DL
HGB UR QL STRIP.AUTO: ABNORMAL
HYALINE CASTS #/AREA URNS AUTO: 0 /LPF (ref 0–8)
KETONES UR STRIP.AUTO-MCNC: NEGATIVE MG/DL
LEUKOCYTE ESTERASE UR QL STRIP.AUTO: ABNORMAL
NITRITE UR QL STRIP.AUTO: NEGATIVE
PH UR STRIP.AUTO: 5.5 [PH] (ref 5–8)
PROT UR STRIP.AUTO-MCNC: ABNORMAL MG/DL
RBC CLUMPS #/AREA URNS AUTO: 2 /HPF (ref 0–4)
SP GR UR STRIP.AUTO: 1.02 (ref 1–1.03)
UA DIPSTICK W REFLEX MICRO PNL UR: YES
URN SPEC COLLECT METH UR: ABNORMAL
UROBILINOGEN UR STRIP-ACNC: 1 E.U./DL
WBC #/AREA URNS AUTO: 84 /HPF (ref 0–5)

## 2024-05-09 PROCEDURE — 7100000011 HC PHASE II RECOVERY - ADDTL 15 MIN: Performed by: PODIATRIST

## 2024-05-09 PROCEDURE — 6360000002 HC RX W HCPCS: Performed by: NURSE ANESTHETIST, CERTIFIED REGISTERED

## 2024-05-09 PROCEDURE — 2500000003 HC RX 250 WO HCPCS: Performed by: NURSE ANESTHETIST, CERTIFIED REGISTERED

## 2024-05-09 PROCEDURE — 6360000002 HC RX W HCPCS: Performed by: PODIATRIST

## 2024-05-09 PROCEDURE — 6360000002 HC RX W HCPCS: Performed by: ANESTHESIOLOGY

## 2024-05-09 PROCEDURE — 6370000000 HC RX 637 (ALT 250 FOR IP): Performed by: ANESTHESIOLOGY

## 2024-05-09 PROCEDURE — 3600000014 HC SURGERY LEVEL 4 ADDTL 15MIN: Performed by: PODIATRIST

## 2024-05-09 PROCEDURE — 2709999900 HC NON-CHARGEABLE SUPPLY: Performed by: PODIATRIST

## 2024-05-09 PROCEDURE — 3700000001 HC ADD 15 MINUTES (ANESTHESIA): Performed by: PODIATRIST

## 2024-05-09 PROCEDURE — 7100000001 HC PACU RECOVERY - ADDTL 15 MIN: Performed by: PODIATRIST

## 2024-05-09 PROCEDURE — 3700000000 HC ANESTHESIA ATTENDED CARE: Performed by: PODIATRIST

## 2024-05-09 PROCEDURE — 3600000004 HC SURGERY LEVEL 4 BASE: Performed by: PODIATRIST

## 2024-05-09 PROCEDURE — 7100000000 HC PACU RECOVERY - FIRST 15 MIN: Performed by: PODIATRIST

## 2024-05-09 PROCEDURE — 6360000002 HC RX W HCPCS

## 2024-05-09 PROCEDURE — 81001 URINALYSIS AUTO W/SCOPE: CPT

## 2024-05-09 PROCEDURE — 2500000003 HC RX 250 WO HCPCS: Performed by: PODIATRIST

## 2024-05-09 PROCEDURE — 7100000010 HC PHASE II RECOVERY - FIRST 15 MIN: Performed by: PODIATRIST

## 2024-05-09 PROCEDURE — A4217 STERILE WATER/SALINE, 500 ML: HCPCS | Performed by: PODIATRIST

## 2024-05-09 PROCEDURE — 2580000003 HC RX 258: Performed by: PODIATRIST

## 2024-05-09 RX ORDER — HYDROMORPHONE HYDROCHLORIDE 2 MG/ML
0.25 INJECTION, SOLUTION INTRAMUSCULAR; INTRAVENOUS; SUBCUTANEOUS EVERY 5 MIN PRN
Status: DISCONTINUED | OUTPATIENT
Start: 2024-05-09 | End: 2024-05-09 | Stop reason: HOSPADM

## 2024-05-09 RX ORDER — ONDANSETRON 2 MG/ML
INJECTION INTRAMUSCULAR; INTRAVENOUS PRN
Status: DISCONTINUED | OUTPATIENT
Start: 2024-05-09 | End: 2024-05-09 | Stop reason: SDUPTHER

## 2024-05-09 RX ORDER — SODIUM CHLORIDE 9 MG/ML
INJECTION, SOLUTION INTRAVENOUS PRN
Status: CANCELLED | OUTPATIENT
Start: 2024-05-09

## 2024-05-09 RX ORDER — KETAMINE HCL IN NACL, ISO-OSM 100MG/10ML
SYRINGE (ML) INJECTION PRN
Status: DISCONTINUED | OUTPATIENT
Start: 2024-05-09 | End: 2024-05-09 | Stop reason: SDUPTHER

## 2024-05-09 RX ORDER — NALOXONE HYDROCHLORIDE 0.4 MG/ML
INJECTION, SOLUTION INTRAMUSCULAR; INTRAVENOUS; SUBCUTANEOUS PRN
Status: DISCONTINUED | OUTPATIENT
Start: 2024-05-09 | End: 2024-05-09 | Stop reason: HOSPADM

## 2024-05-09 RX ORDER — HYDROMORPHONE HYDROCHLORIDE 2 MG/ML
0.5 INJECTION, SOLUTION INTRAMUSCULAR; INTRAVENOUS; SUBCUTANEOUS EVERY 5 MIN PRN
Status: COMPLETED | OUTPATIENT
Start: 2024-05-09 | End: 2024-05-09

## 2024-05-09 RX ORDER — LIDOCAINE HYDROCHLORIDE 20 MG/ML
INJECTION, SOLUTION INFILTRATION; PERINEURAL PRN
Status: DISCONTINUED | OUTPATIENT
Start: 2024-05-09 | End: 2024-05-09 | Stop reason: SDUPTHER

## 2024-05-09 RX ORDER — DEXAMETHASONE SODIUM PHOSPHATE 4 MG/ML
INJECTION, SOLUTION INTRA-ARTICULAR; INTRALESIONAL; INTRAMUSCULAR; INTRAVENOUS; SOFT TISSUE PRN
Status: DISCONTINUED | OUTPATIENT
Start: 2024-05-09 | End: 2024-05-09 | Stop reason: SDUPTHER

## 2024-05-09 RX ORDER — KETOROLAC TROMETHAMINE 30 MG/ML
INJECTION, SOLUTION INTRAMUSCULAR; INTRAVENOUS PRN
Status: DISCONTINUED | OUTPATIENT
Start: 2024-05-09 | End: 2024-05-09 | Stop reason: SDUPTHER

## 2024-05-09 RX ORDER — BUPIVACAINE HYDROCHLORIDE 5 MG/ML
INJECTION, SOLUTION EPIDURAL; INTRACAUDAL
Status: COMPLETED | OUTPATIENT
Start: 2024-05-09 | End: 2024-05-09

## 2024-05-09 RX ORDER — MEPERIDINE HYDROCHLORIDE 25 MG/ML
12.5 INJECTION INTRAMUSCULAR; INTRAVENOUS; SUBCUTANEOUS EVERY 5 MIN PRN
Status: DISCONTINUED | OUTPATIENT
Start: 2024-05-09 | End: 2024-05-09 | Stop reason: HOSPADM

## 2024-05-09 RX ORDER — SODIUM CHLORIDE 0.9 % (FLUSH) 0.9 %
5-40 SYRINGE (ML) INJECTION PRN
Status: DISCONTINUED | OUTPATIENT
Start: 2024-05-09 | End: 2024-05-09 | Stop reason: HOSPADM

## 2024-05-09 RX ORDER — PROPOFOL 10 MG/ML
INJECTION, EMULSION INTRAVENOUS PRN
Status: DISCONTINUED | OUTPATIENT
Start: 2024-05-09 | End: 2024-05-09 | Stop reason: SDUPTHER

## 2024-05-09 RX ORDER — MAGNESIUM HYDROXIDE 1200 MG/15ML
LIQUID ORAL CONTINUOUS PRN
Status: COMPLETED | OUTPATIENT
Start: 2024-05-09 | End: 2024-05-09

## 2024-05-09 RX ORDER — CIPROFLOXACIN 500 MG/1
500 TABLET, FILM COATED ORAL 2 TIMES DAILY
Qty: 14 TABLET | Refills: 0 | Status: SHIPPED | OUTPATIENT
Start: 2024-05-09 | End: 2024-05-16

## 2024-05-09 RX ORDER — SODIUM CHLORIDE 0.9 % (FLUSH) 0.9 %
5-40 SYRINGE (ML) INJECTION PRN
Status: CANCELLED | OUTPATIENT
Start: 2024-05-09

## 2024-05-09 RX ORDER — SODIUM CHLORIDE, SODIUM LACTATE, POTASSIUM CHLORIDE, CALCIUM CHLORIDE 600; 310; 30; 20 MG/100ML; MG/100ML; MG/100ML; MG/100ML
INJECTION, SOLUTION INTRAVENOUS CONTINUOUS
Status: DISCONTINUED | OUTPATIENT
Start: 2024-05-09 | End: 2024-05-09 | Stop reason: HOSPADM

## 2024-05-09 RX ORDER — PHENYLEPHRINE HCL IN 0.9% NACL 1 MG/10 ML
SYRINGE (ML) INTRAVENOUS PRN
Status: DISCONTINUED | OUTPATIENT
Start: 2024-05-09 | End: 2024-05-09 | Stop reason: SDUPTHER

## 2024-05-09 RX ORDER — FENTANYL CITRATE 50 UG/ML
INJECTION, SOLUTION INTRAMUSCULAR; INTRAVENOUS PRN
Status: DISCONTINUED | OUTPATIENT
Start: 2024-05-09 | End: 2024-05-09 | Stop reason: SDUPTHER

## 2024-05-09 RX ORDER — CIPROFLOXACIN 2 MG/ML
200 INJECTION, SOLUTION INTRAVENOUS ONCE
Status: COMPLETED | OUTPATIENT
Start: 2024-05-09 | End: 2024-05-09

## 2024-05-09 RX ORDER — HYDROMORPHONE HYDROCHLORIDE 2 MG/ML
INJECTION, SOLUTION INTRAMUSCULAR; INTRAVENOUS; SUBCUTANEOUS
Status: COMPLETED
Start: 2024-05-09 | End: 2024-05-09

## 2024-05-09 RX ORDER — MEPERIDINE HYDROCHLORIDE 25 MG/ML
INJECTION INTRAMUSCULAR; INTRAVENOUS; SUBCUTANEOUS
Status: COMPLETED
Start: 2024-05-09 | End: 2024-05-09

## 2024-05-09 RX ORDER — CIPROFLOXACIN 2 MG/ML
200 INJECTION, SOLUTION INTRAVENOUS ONCE
Status: DISCONTINUED | OUTPATIENT
Start: 2024-05-09 | End: 2024-05-09

## 2024-05-09 RX ORDER — OXYCODONE HYDROCHLORIDE 5 MG/1
5 TABLET ORAL
Status: COMPLETED | OUTPATIENT
Start: 2024-05-09 | End: 2024-05-09

## 2024-05-09 RX ORDER — LIDOCAINE HYDROCHLORIDE 10 MG/ML
INJECTION, SOLUTION INFILTRATION; PERINEURAL
Status: COMPLETED | OUTPATIENT
Start: 2024-05-09 | End: 2024-05-09

## 2024-05-09 RX ORDER — ONDANSETRON 2 MG/ML
4 INJECTION INTRAMUSCULAR; INTRAVENOUS
Status: DISCONTINUED | OUTPATIENT
Start: 2024-05-09 | End: 2024-05-09 | Stop reason: HOSPADM

## 2024-05-09 RX ORDER — SODIUM CHLORIDE 9 MG/ML
INJECTION, SOLUTION INTRAVENOUS PRN
Status: DISCONTINUED | OUTPATIENT
Start: 2024-05-09 | End: 2024-05-09 | Stop reason: HOSPADM

## 2024-05-09 RX ORDER — SODIUM CHLORIDE 0.9 % (FLUSH) 0.9 %
5-40 SYRINGE (ML) INJECTION EVERY 12 HOURS SCHEDULED
Status: CANCELLED | OUTPATIENT
Start: 2024-05-09

## 2024-05-09 RX ORDER — MIDAZOLAM HYDROCHLORIDE 1 MG/ML
INJECTION INTRAMUSCULAR; INTRAVENOUS PRN
Status: DISCONTINUED | OUTPATIENT
Start: 2024-05-09 | End: 2024-05-09 | Stop reason: SDUPTHER

## 2024-05-09 RX ORDER — SODIUM CHLORIDE 0.9 % (FLUSH) 0.9 %
5-40 SYRINGE (ML) INJECTION EVERY 12 HOURS SCHEDULED
Status: DISCONTINUED | OUTPATIENT
Start: 2024-05-09 | End: 2024-05-09 | Stop reason: HOSPADM

## 2024-05-09 RX ADMIN — MEPERIDINE HYDROCHLORIDE 12.5 MG: 25 INJECTION INTRAMUSCULAR; INTRAVENOUS; SUBCUTANEOUS at 10:31

## 2024-05-09 RX ADMIN — Medication 100 MCG: at 08:47

## 2024-05-09 RX ADMIN — CEFAZOLIN 2000 MG: 2 INJECTION, POWDER, FOR SOLUTION INTRAMUSCULAR; INTRAVENOUS at 08:29

## 2024-05-09 RX ADMIN — HYDROMORPHONE HYDROCHLORIDE 0.5 MG: 2 INJECTION, SOLUTION INTRAMUSCULAR; INTRAVENOUS; SUBCUTANEOUS at 10:16

## 2024-05-09 RX ADMIN — FENTANYL CITRATE 50 MCG: 50 INJECTION, SOLUTION INTRAMUSCULAR; INTRAVENOUS at 08:50

## 2024-05-09 RX ADMIN — HYDROMORPHONE HYDROCHLORIDE 0.5 MG: 2 INJECTION, SOLUTION INTRAMUSCULAR; INTRAVENOUS; SUBCUTANEOUS at 10:11

## 2024-05-09 RX ADMIN — Medication 100 MCG: at 08:46

## 2024-05-09 RX ADMIN — KETOROLAC TROMETHAMINE 30 MG: 60 INJECTION, SOLUTION INTRAMUSCULAR at 09:26

## 2024-05-09 RX ADMIN — PROPOFOL 150 MG: 10 INJECTION, EMULSION INTRAVENOUS at 08:34

## 2024-05-09 RX ADMIN — DEXAMETHASONE SODIUM PHOSPHATE 8 MG: 4 INJECTION, SOLUTION INTRAMUSCULAR; INTRAVENOUS at 08:36

## 2024-05-09 RX ADMIN — LIDOCAINE HYDROCHLORIDE 100 MG: 20 INJECTION, SOLUTION INFILTRATION; PERINEURAL at 08:34

## 2024-05-09 RX ADMIN — Medication 100 MCG: at 09:20

## 2024-05-09 RX ADMIN — Medication 25 MG: at 09:47

## 2024-05-09 RX ADMIN — ONDANSETRON 4 MG: 2 INJECTION INTRAMUSCULAR; INTRAVENOUS at 09:26

## 2024-05-09 RX ADMIN — CIPROFLOXACIN 200 MG: 200 INJECTION, SOLUTION INTRAVENOUS at 08:41

## 2024-05-09 RX ADMIN — OXYCODONE HYDROCHLORIDE 5 MG: 5 TABLET ORAL at 11:00

## 2024-05-09 RX ADMIN — Medication 25 MG: at 09:09

## 2024-05-09 RX ADMIN — Medication 100 MCG: at 09:07

## 2024-05-09 RX ADMIN — SODIUM CHLORIDE, POTASSIUM CHLORIDE, SODIUM LACTATE AND CALCIUM CHLORIDE: 600; 310; 30; 20 INJECTION, SOLUTION INTRAVENOUS at 07:34

## 2024-05-09 RX ADMIN — HYDROMORPHONE HYDROCHLORIDE 0.25 MG: 2 INJECTION, SOLUTION INTRAMUSCULAR; INTRAVENOUS; SUBCUTANEOUS at 10:23

## 2024-05-09 RX ADMIN — FENTANYL CITRATE 50 MCG: 50 INJECTION, SOLUTION INTRAMUSCULAR; INTRAVENOUS at 08:36

## 2024-05-09 RX ADMIN — MIDAZOLAM 2 MG: 1 INJECTION INTRAMUSCULAR; INTRAVENOUS at 08:27

## 2024-05-09 ASSESSMENT — PAIN DESCRIPTION - LOCATION
LOCATION: FOOT

## 2024-05-09 ASSESSMENT — PAIN DESCRIPTION - PAIN TYPE
TYPE: SURGICAL PAIN

## 2024-05-09 ASSESSMENT — PAIN DESCRIPTION - FREQUENCY
FREQUENCY: CONTINUOUS

## 2024-05-09 ASSESSMENT — PAIN DESCRIPTION - DESCRIPTORS
DESCRIPTORS: ACHING;SORE
DESCRIPTORS: ACHING;BURNING;SORE
DESCRIPTORS: ACHING;POUNDING;SHARP

## 2024-05-09 ASSESSMENT — PAIN DESCRIPTION - ORIENTATION
ORIENTATION: LEFT

## 2024-05-09 ASSESSMENT — PAIN SCALES - GENERAL
PAINLEVEL_OUTOF10: 2
PAINLEVEL_OUTOF10: 10
PAINLEVEL_OUTOF10: 10
PAINLEVEL_OUTOF10: 4
PAINLEVEL_OUTOF10: 6
PAINLEVEL_OUTOF10: 2
PAINLEVEL_OUTOF10: 0

## 2024-05-09 ASSESSMENT — ENCOUNTER SYMPTOMS: SHORTNESS OF BREATH: 0

## 2024-05-09 NOTE — ANESTHESIA POSTPROCEDURE EVALUATION
Department of Anesthesiology  Postprocedure Note    Patient: Amanda Zhu  MRN: [id number]  YOB: 1970  Date of evaluation: 5/9/2024    Procedure Summary       Date: 05/09/24 Room / Location: [address]    Anesthesia Start: 0829 Anesthesia Stop: 0950    Procedure: REMOVAL OF INTERNAL FIXATION - LEFT FOOT (Left: Foot) Diagnosis:       Pain in bone fixation device, subsequent encounter      (Pain in bone fixation device, subsequent encounter [T84.84XD])    Surgeons: Barney Lai DPM Responsible Provider: Brian Garnica MD    Anesthesia Type: general ASA Status: 3            Anesthesia Type: No value filed.    Shruthi Phase I: Shruthi Score: 10    Shruthi Phase II: Shruthi Score: 10    Anesthesia Post Evaluation    Patient location during evaluation: PACU  Patient participation: complete - patient participated  Level of consciousness: awake  Airway patency: patent  Nausea & Vomiting: no nausea and no vomiting  Cardiovascular status: hemodynamically stable  Respiratory status: acceptable  Hydration status: stable  Multimodal analgesia pain management approach  Pain management: adequate    No notable events documented.

## 2024-05-09 NOTE — PROGRESS NOTES
Called Anesthesia for PACU orders. Patient just saying foot hurts but can not give a number for pain or open eyes.

## 2024-05-09 NOTE — OP NOTE
Operative Note      Patient: Amanda Zhu  YOB: 1970  MRN: [id number]    Date of Procedure: 5/9/2024    Pre-Op Diagnosis Codes:     * Pain in bone fixation device, subsequent encounter [T84.84XD]    Post-Op Diagnosis: Same       Procedure(s):  20680 - REMOVAL OF INTERNAL FIXATION, LEFT FOOT  99785 - FLUOROSCOPY, LEFT FOOT     Surgeon(s):  Barney Lai DPM     Assistant:  Resident: Primitivo Estevez PGY-III  Student: Tc Arias MS-IV     Hemostasis: Pneumatic Calf Tourniquet @ 250 mmHg (48 minutes), Electrocautery, and Anatomic Dissection      Injectables: Pre-Op 20 cc of 1% Lidocaine plain and Post-Op 20 cc of 0.5 % Marcaine plain     Materials: 3-0/4-0/5-0 Vicryl     Anesthesia: General     Estimated Blood Loss (mL): Minimal     Complications: None    Specimens:   * No specimens in log *    Implants:  Implant Name Type Inv. Item Serial No.  Lot No. LRB No. Used Action   STAPLE BNE FIX [id number] THK1.8X1.3MM ANK FT SUPERELASTIC - [id number]  STAPLE BNE FIX [id number] THK1.8X1.3MM ANK FT SUPERELASTIC  [employer] [id number] Left 1 Explanted   STAPLE INT FIX [id number] THK2.5X1.6MM ANK FT SUP E EASYCLIP - [id number]  STAPLE INT FIX [id number] THK2.5X1.6MM ANK FT SUP E EASYCLIP  [employer] [facility] [id number] Left 1 Explanted         Drains: * No LDAs found *    Findings:  Infection Present At Time Of Surgery (PATOS) (choose all levels that have infection present):  No infection present  Other Findings: Staples removed w/o incident     Detailed Description of Procedure:     INDICATIONS FOR PROCEDURE: This patient has signs and symptoms clinically and radiographically consistent with the above mentioned preoperative diagnosis. Having failed conservative treatment, it was determined that the patient would benefit from surgical intervention. All potential risks, benefits, and complications were discussed with the patient prior to the scheduling of surgery. All the patient's  Ming private office, but with the patient having a UTI upon arrival to the hospital today she was given a 7 day course of Ciprofloxacin 500 mg BID. The patient is to follow-up with Dr. Lai in his private office within 5-7 days. The patient is to keep dressing clean, dry and intact at all times. The patient is to call if any complications occur.    This operative report was dictated on behalf of Dr. Barney Lai DPM.    Primitivo Estevez DPM  Podiatric Resident, PGY-3  Pager #: [phone] or Perfect Serve    Electronically signed by Primitivo Estevez DPM on 5/9/2024 at 9:55 AM

## 2024-05-09 NOTE — H&P
Date of Surgery Update:  Amanda Zhu was seen, history and physical examination reviewed, and patient examined by me today. There have been no significant clinical changes since the completion of the previous history and physical.    The risk, benefits, and alternatives of the proposed procedure have been explained to the patient (or appropriate guardian) and understanding verbalized. All questions answered. Patient wishes to proceed.    Electronically signed by: Barney Lai DPM,5/9/2024,8:12 AM

## 2024-05-09 NOTE — PROGRESS NOTES
Pt awake and alert at this time. Pt on RA, and VSS. Pt has slight pain but better and will be ready for a pain pill. Patient has no and nausea, tolerating PO. Skin warm, palpable pulses and able to wiggle left foot and toes. Pt meets criteria to be discharged from Phase 1.

## 2024-05-09 NOTE — ANESTHESIA PRE PROCEDURE
01:27 PM    RBC 4.33 01/01/2018 01:27 PM    HGB 10.7 01/01/2018 01:27 PM    HCT 33.5 01/01/2018 01:27 PM    MCV 77.4 01/01/2018 01:27 PM    RDW 15.4 01/01/2018 01:27 PM     01/01/2018 01:27 PM       CMP:   Lab Results   Component Value Date/Time     01/01/2018 01:26 PM    K 3.9 01/01/2018 01:26 PM     01/01/2018 01:26 PM    CO2 27 01/01/2018 01:26 PM    BUN 10 01/01/2018 01:26 PM    CREATININE 0.5 01/01/2018 01:26 PM    GFRAA >60 01/01/2018 01:26 PM    AGRATIO 1.0 01/01/2018 01:26 PM    LABGLOM >60 01/01/2018 01:26 PM    GLUCOSE 83 01/01/2018 01:26 PM    CALCIUM 8.7 01/01/2018 01:26 PM    BILITOT <0.2 01/01/2018 01:26 PM    ALKPHOS 70 01/01/2018 01:26 PM    AST 18 01/01/2018 01:26 PM    ALT 12 01/01/2018 01:26 PM       POC Tests: No results for input(s): \"POCGLU\", \"POCNA\", \"POCK\", \"POCCL\", \"POCBUN\", \"POCHEMO\", \"POCHCT\" in the last 72 hours.    Coags:   Lab Results   Component Value Date/Time    PROTIME 11.3 10/18/2017 10:05 AM    INR 1.00 10/18/2017 10:05 AM    APTT 29.4 10/18/2017 10:05 AM       HCG (If Applicable):   Lab Results   Component Value Date    PREGTESTUR Negative 02/10/2022        ABGs: No results found for: \"PHART\", \"PO2ART\", \"GDM1YZC\", \"LQU2ZCI\", \"BEART\", \"A9ODWZDP\"     Type & Screen (If Applicable):  No results found for: \"LABABO\"    Drug/Infectious Status (If Applicable):  No results found for: \"HIV\", \"HEPCAB\"    COVID-19 Screening (If Applicable):   Lab Results   Component Value Date/Time    COVID19 NOT DETECTED 10/14/2020 08:05 PM           Anesthesia Evaluation  Patient summary reviewed and Nursing notes reviewed   no history of anesthetic complications:   Airway: Mallampati: III  TM distance: >3 FB   Neck ROM: full  Mouth opening: > = 3 FB   Dental: normal exam         Pulmonary:normal exam        (-) COPD, asthma and shortness of breath                           Cardiovascular:        (-) hypertension, CABG/stent and dysrhythmias      Rhythm: regular  Rate: normal

## 2024-05-09 NOTE — PROGRESS NOTES
Pt arrived from OR to PACU bay 7. Reported received from OR staff. Surgical incisions dressings in place to left foot. Pt on simple mask 4L, NSR, and VSS.

## 2024-05-09 NOTE — DISCHARGE INSTRUCTIONS
[facility]   [id number]  [place], [place] [address]  [phone]    Dr. Barney Lai                                             Post Operative Instructions    1.  Have Prescriptions filled and take as directed.  All medications should be taken with food or milk.    2.  Keep foot elevated six inches above the level of the heart.  Support feet, legs, and knees with pillows.    3.  KEEP FOOT ELEVATED AS MUCH AS POSSIBLE UNTIL YOUR NEXT VISIT    4.  Place an ice pack on the bandaged site for 15 minutes every hour while awake    5.  Keep dressing clean, dry, and intact.  DO NOT REMOVE DRESSING.  CALL THE OFFICE IF BANDAGE COMES OFF.    6.  For the first 7 days after surgery, take temperature by mouth three times a day.  Call the office if greater than 101F.    7.  Ambulate with partial weight bearing to the left lower extremity in CAM boot    8.  All instructions are to be followed until otherwise instructed by your surgeon.    9.  Call our office if you have any concerns or questions which arise.  Our phones are answered 24 hours a day.  [phone]    10.  Your first post-operative appointment is scheduled, call the office for appointment date and time if not known prior to today.     ORTHOPEDIC/PODIATRY DISCHARGE INSTRUCTIONS    Follow your surgeons instructions.  Make follow-up appointment.  Observe operative area for signs of excessive bleeding such as a slow general ooze that saturates the dressing or bright red bleeding. In either case, apply pressure to the area and elevate if possible and call your surgeon right away.  Observe the affected extremity for circulation or nerve impairment such as a change in color, numbness, tingling, coldness or increased pain. If any of these symptoms are present call your surgeon.  Observe operative site for any signs of infection such as increased pain, redness, fever greater than 101 degrees, swelling, foul odor or drainage.

## 2024-05-09 NOTE — PROGRESS NOTES
Discharge instructions review with patient and daughter Sandrine. Pt discharged via wheelchair. Pt discharged with 1RX, CAM boot and all other belongings. Sandrine taking stable pt home.

## 2024-05-09 NOTE — BRIEF OP NOTE
Brief Postoperative Note      Patient: Amanda Zhu  YOB: 1970  MRN: [id number]    Date of Procedure: 5/9/2024    Pre-Op Diagnosis Codes:     * Pain in bone fixation device, subsequent encounter [T84.84XD]    Post-Op Diagnosis: Same       Procedure(s):  20680 - REMOVAL OF INTERNAL FIXATION, LEFT FOOT  98916 - FLUOROSCOPY, LEFT FOOT    Surgeon(s):  Barney Lai DPM    Assistant:  Resident: Primitivo Estevez, PGY-III  Student: Tc Arias MS-IV    Hemostasis: Pneumatic Calf Tourniquet @ 250 mmHg (48 minutes), Electrocautery, and Anatomic Dissection     Injectables: Pre-Op 20 cc of 1% Lidocaine plain and Post-Op 20 cc of 0.5 % Marcaine plain    Materials: 3-0/4-0/5-0 Vicryl    Anesthesia: General    Estimated Blood Loss (mL): Minimal    Complications: None    Specimens:   * No specimens in log *    Implants:  Implant Name Type Inv. Item Serial No.  Lot No. LRB No. Used Action   STAPLE BNE FIX [id number] THK1.8X1.3MM ANK FT SUPERELASTIC - [id number]  STAPLE BNE FIX [id number] THK1.8X1.3MM ANK FT SUPERELASTIC  [employer] [id number] Left 1 Explanted   STAPLE INT FIX [id number] THK2.5X1.6MM ANK FT SUP E EASYCLIP - [id number]  STAPLE INT FIX [id number] THK2.5X1.6MM ANK FT SUP E EASYCLIP  [employer] [id number] Left 1 Explanted         Drains: * No LDAs found *    Findings:  Infection Present At Time Of Surgery (PATOS) (choose all levels that have infection present):  No infection present  Other Findings: Staples removed w/o incident     Electronically signed by Primitivo Estevez DPM on 5/9/2024 at 9:53 AM

## (undated) DEVICE — GLOVE ORTHO 7 1/2   MSG9475

## (undated) DEVICE — GAUZE,SPONGE,4"X4",8PLY,STRL,LF,10/TRAY: Brand: MEDLINE

## (undated) DEVICE — CLIP LIG M TI 6 SIL HNDL FOR OPN AND ENDOSCP SGL APPL

## (undated) DEVICE — GLOVE SURG SZ 75 L12IN FNGR THK79MIL GRN LTX FREE

## (undated) DEVICE — STERILE PVP: Brand: MEDLINE INDUSTRIES, INC.

## (undated) DEVICE — CANNULATED SCREW - 16MM THREAD
Type: IMPLANTABLE DEVICE | Site: FOOT | Status: NON-FUNCTIONAL
Brand: AXSOS 3

## (undated) DEVICE — T-DRAPE,EXTREMITY,STERILE: Brand: MEDLINE

## (undated) DEVICE — HYPODERMIC SAFETY NEEDLE: Brand: MAGELLAN

## (undated) DEVICE — SYRINGE MED 30ML STD CLR PLAS LUERLOCK TIP N CTRL DISP

## (undated) DEVICE — PODIATRY PK

## (undated) DEVICE — JEWISH HOSPITAL TURNOVER KIT: Brand: MEDLINE INDUSTRIES, INC.

## (undated) DEVICE — ELECTRODE PT RET AD L9FT HI MOIST COND ADH HYDRGEL CORDED

## (undated) DEVICE — GAUZE FLUFF 2 PLY: Brand: DEROYAL

## (undated) DEVICE — SUTURE 2-0 VCRL CTD FS-1 J443H

## (undated) DEVICE — BANDAGE GZ W2XL75IN ST RAYON POLY CNFRM STRTCH LTWT

## (undated) DEVICE — SUTURE VCRL + SZ 3-0 L18IN ABSRB UD PS-2 3/8 CIR REV CUT VCP497H

## (undated) DEVICE — KIT LSR L65CM 0.035IN NDL 21GA GRP G TIP FRS FBR NEVERTOUCH

## (undated) DEVICE — STANDARD DRILL BIT , AO

## (undated) DEVICE — GOWN SIRUS NONREIN XL W/TWL: Brand: MEDLINE INDUSTRIES, INC.

## (undated) DEVICE — REAMER FOR CROSS-PLATES: Brand: ANCHORAGE

## (undated) DEVICE — SUTURE VCRL SZ 4-0 L18IN ABSRB UD L19MM PS-2 3/8 CIR PRIM J496H

## (undated) DEVICE — PADDING CAST W4INXL4YD ST COT RAYON MICROPLEATED HIGHLY

## (undated) DEVICE — SYRINGE IRRIG 60ML SFT PLIABLE BLB EZ TO GRP 1 HND USE W/

## (undated) DEVICE — APPLICATOR PREP 26ML 0.7% IOD POVACRYLEX 74% ISO ALC ST

## (undated) DEVICE — 3M™ STERI-STRIP™ REINFORCED ADHESIVE SKIN CLOSURES, R1541, 1/4 IN X 3 IN (6 MM X 75 MM), 3 STRIPS/ENVELOPE: Brand: 3M™ STERI-STRIP™

## (undated) DEVICE — 3M™ STERI-STRIP™ REINFORCED ADHESIVE SKIN CLOSURES, R1546, 1/4 IN X 4 IN (6 MM X 100 MM), 10 STRIPS/ENVELOPE: Brand: 3M™ STERI-STRIP™

## (undated) DEVICE — SUTURE VCRL SZ 3-0 L27IN ABSRB UD L26MM CT-2 1/2 CIR J232H

## (undated) DEVICE — GLOVE SURG SZ 8 CRM LTX FREE POLYISOPRENE POLYMER BEAD ANTI

## (undated) DEVICE — PADDING CAST W4INXL4YD HIGHLY ABSRB THAN COT EZ APPL

## (undated) DEVICE — ESMARK: Brand: DEROYAL

## (undated) DEVICE — BLANKET WRM W29.9XL79.1IN UP BODY FORC AIR MISTRAL-AIR

## (undated) DEVICE — SUTURE PERMAHAND SZ 4-0 L12X30IN NONABSORBABLE BLK SILK A303H

## (undated) DEVICE — SUTURE VICRYL + SZ 3-0 L27IN ABSRB UD CT-2 L26MM 1/2 CIR TAPR VCP232H

## (undated) DEVICE — GLOVE SURG SZ 85 L12IN FNGR ORTHO 126MIL CRM LTX FREE

## (undated) DEVICE — WET SKIN PREP TRAY: Brand: MEDLINE INDUSTRIES, INC.

## (undated) DEVICE — BNDG,ELSTC,MATRIX,STRL,4"X5YD,LF,HOOK&LP: Brand: MEDLINE

## (undated) DEVICE — STOCKINETTE,IMPERVIOUS,12X48,STERILE: Brand: MEDLINE

## (undated) DEVICE — SUTURE VCRL + SZ 3-0 L27IN ABSRB UD L26MM SH 1/2 CIR VCP416H

## (undated) DEVICE — GARMENT,MEDLINE,DVT,INT,CALF,MED, GEN2: Brand: MEDLINE

## (undated) DEVICE — SHEET, T, LAPAROTOMY, STERILE: Brand: MEDLINE

## (undated) DEVICE — COUNTER NDL 40 COUNT HLD 70 NUM FOAM BLK SGL MAG W BLDE REMV

## (undated) DEVICE — BANDAGE COMPR W4INXL12FT E DISP ESMARCH EVEN

## (undated) DEVICE — SINGLE ACTION PUMPING SYSTEM

## (undated) DEVICE — MAJOR SET UP PK

## (undated) DEVICE — 3M™ COBAN™ NL STERILE NON-LATEX SELF-ADHERENT WRAP, 2084S, 4 IN X 5 YD (10 CM X 4,5 M), 18 ROLLS/CASE: Brand: 3M™ COBAN™

## (undated) DEVICE — COVER LT HNDL BLU PLAS

## (undated) DEVICE — APPLICATOR MEDICATED 26 CC SOLUTION HI LT ORNG CHLORAPREP

## (undated) DEVICE — SOLUTION IV 1000ML 0.9% SOD CHL PH 5 INJ USP VIAFLX PLAS

## (undated) DEVICE — PACK PROCEDURE SURG EXTREMITY MFFOP CUST

## (undated) DEVICE — SUTURE VICRYL + SZ 5 0 L18IN ABSRB UD PS 2 L19MM PRIM REV CUT VCP495G

## (undated) DEVICE — SPLNT ORTHO GLASS 4X15

## (undated) DEVICE — APPLIER LIG CLP M L11IN TI STR RNG HNDL FOR 20 CLP DISP

## (undated) DEVICE — SUTURE VCRL SZ 3-0 L27IN ABSRB UD L26MM SH 1/2 CIR J416H

## (undated) DEVICE — CATHETER ABLAT 7FR L60CM HEAT ELEMENT L7CM 0.025IN

## (undated) DEVICE — TOWEL,OR,DSP,ST,BLUE,DLX,8/PK,10PK/CS: Brand: MEDLINE

## (undated) DEVICE — SYRINGE, LUER LOCK, 10ML: Brand: MEDLINE

## (undated) DEVICE — DRAPE,U/ SHT,SPLIT,PLAS,STERIL: Brand: MEDLINE

## (undated) DEVICE — INTENDED FOR TISSUE SEPARATION, AND OTHER PROCEDURES THAT REQUIRE A SHARP SURGICAL BLADE TO PUNCTURE OR CUT.: Brand: BARD-PARKER ® CARBON RIB-BACK BLADES

## (undated) DEVICE — INTENDED FOR TISSUE SEPARATION, AND OTHER PROCEDURES THAT REQUIRE A SHARP SURGICAL BLADE TO PUNCTURE OR CUT.: Brand: BARD-PARKER ® STAINLESS STEEL BLADES

## (undated) DEVICE — GUIDE WIRE

## (undated) DEVICE — SUTURE NONABSORBABLE MONOFILAMENT 5-0 C-1 1X24 IN PROLENE 8725H

## (undated) DEVICE — 3M™ TEGADERM™ TRANSPARENT FILM DRESSING FRAME STYLE, 1626W, 4 IN X 4-3/4 IN (10 CM X 12 CM), 50/CT 4CT/CASE: Brand: 3M™ TEGADERM™

## (undated) DEVICE — SHEET,DRAPE,53X77,STERILE: Brand: MEDLINE

## (undated) DEVICE — NEEDLE SPNL L3.5IN PNK HUB S STL REG WALL FIT STYL W/ QNCKE

## (undated) DEVICE — STANDARD HYPODERMIC NEEDLE,POLYPROPYLENE HUB: Brand: MONOJECT

## (undated) DEVICE — SUTURE FIBERWIRE SZ 2 W/ TAPERED NEEDLE BLUE L38IN NONABSORB BLU L26.5MM 1/2 CIRCLE AR7200

## (undated) DEVICE — HOLDING PIN: Brand: ANCHORAGE

## (undated) DEVICE — SUTURE NONABSORBABLE MONOFILAMENT 4-0 PS-2 18 IN BLK ETHILON 1667G

## (undated) DEVICE — 3M™ COBAN™ STERILE SELF-ADHERENT WRAP, 1584S, 4 IN X 5 YD (10 CM X 4,5 M), 18 ROLLS/CASE: Brand: 3M™ COBAN™

## (undated) DEVICE — STRIP,CLOSURE,WOUND,MEDI-STRIP,1/2X4: Brand: MEDLINE

## (undated) DEVICE — SYRINGE MED 10ML LUERLOCK TIP W/O SFTY DISP

## (undated) DEVICE — ROYAL SILK SURGICAL GOWN, XXXL, LONG: Brand: CONVERTORS

## (undated) DEVICE — PADDING CAST W6INXL4YD ST COT RAYON MICROPLEATED HIGHLY

## (undated) DEVICE — SUTURE VCRL + SZ 3-0 L18IN ABSRB UD SH 1/2 CIR TAPERCUT NDL VCP864D

## (undated) DEVICE — DEVON TUBE HOLDER REMOVABLE TOUCH FASTEN STRAP: Brand: DEVON

## (undated) DEVICE — 3M™ STERI-STRIP™ REINFORCED ADHESIVE SKIN CLOSURES, R1547, 1/2 IN X 4 IN (12 MM X 100 MM), 6 STRIPS/ENVELOPE: Brand: 3M™ STERI-STRIP™

## (undated) DEVICE — SUTURE ETHLN SZ 4-0 L18IN NONABSORBABLE BLK L19MM PS-2 3/8 1667H

## (undated) DEVICE — MERCY HEALTH WEST TURNOVER: Brand: MEDLINE INDUSTRIES, INC.

## (undated) DEVICE — BANDAGE COMPR M W4INXL10YD WHT BGE VELC E MTRX HK AND LOOP

## (undated) DEVICE — FOOT SWITCH DRAPE: Brand: UNBRANDED

## (undated) DEVICE — COVER,TABLE,HEAVY DUTY,77"X90",STRL: Brand: MEDLINE

## (undated) DEVICE — SUTURE VCRL SZ 5-0 L18IN ABSRB UD P-3 L13MM 3/8 CIR PRIM J493H

## (undated) DEVICE — UNTHREADED GUIDE WIRE: Brand: FIXOS

## (undated) DEVICE — GAUZE,SPONGE,4"X4",16PLY,XRAY,STRL,LF: Brand: MEDLINE

## (undated) DEVICE — SUTURE PERMAHAND SZ 3-0 L18IN NONABSORBABLE BLK L26MM SH C013D

## (undated) DEVICE — BANDAGE,GAUZE,BULKEE II,4.5"X4.1YD,STRL: Brand: MEDLINE

## (undated) DEVICE — MERCY FAIRFIELD TURNOVER KIT: Brand: MEDLINE INDUSTRIES, INC.

## (undated) DEVICE — NEEDLE SPNL 20GA L3.5IN YEL HUB S STL REG WALL FIT STYL W/

## (undated) DEVICE — SPONGE GZ W4XL4IN COT 12 PLY TYP VII WVN C FLD DSGN

## (undated) DEVICE — DRILL BIT, AO DIA2.6MM X 135MM, SCALED: Brand: VARIAX

## (undated) DEVICE — E-Z CLEAN, NON-STICK, PTFE COATED, ELECTROSURGICAL BLADE ELECTRODE, 2.5 INCH (6.35 CM): Brand: EZ CLEAN

## (undated) DEVICE — LOWER EXTREMITY: Brand: MEDLINE INDUSTRIES, INC.

## (undated) DEVICE — SOLUTION IV IRRIG POUR BRL 0.9% SODIUM CHL 2F7124

## (undated) DEVICE — SUTURE VCRL + SZ 4-0 L18IN ABSRB UD L19MM PS-2 3/8 CIR PRIM VCP496H

## (undated) DEVICE — SUTURE PERMAHAND SZ 2-0 L30IN NONABSORBABLE BLK SILK W/O A305H

## (undated) DEVICE — SONICANCHOR KIT
Type: IMPLANTABLE DEVICE | Site: FOOT | Status: NON-FUNCTIONAL
Brand: SONICANCHOR
Removed: 2022-02-10

## (undated) DEVICE — SUTURE VCRL SZ 4-0 L27IN ABSRB UD L26MM SH 1/2 CIR J415H

## (undated) DEVICE — TRAY PREP DRY W/ PREM GLV 2 APPL 6 SPNG 2 UNDPD 1 OVERWRAP

## (undated) DEVICE — SUTURE VCRL + SZ 2-0 L27IN ABSRB WHT SH 1/2 CIR TAPERCUT VCP417H

## (undated) DEVICE — POSITIONING PIN

## (undated) DEVICE — SUTURE VCRL + SZ 3-0 L27IN ABSRB UD CT-2 L26MM 1/2 CIR TAPR VCP232H

## (undated) DEVICE — C-ARM: Brand: UNBRANDED

## (undated) DEVICE — WIRE FIXATION .045IN 5IN C-WIRE SS SPADE
Type: IMPLANTABLE DEVICE | Site: ANKLE | Status: NON-FUNCTIONAL
Removed: 2022-02-10

## (undated) DEVICE — SOLUTION IRRIG 500ML 0.9% SOD CHL USP POUR PLAS BTL

## (undated) DEVICE — JOINT PREP INSTRUMENT KIT: Brand: ORTHOLOC™ 2

## (undated) DEVICE — GLOVE ORANGE PI 8   MSG9080

## (undated) DEVICE — GOWN,AURORA,NONREINF,RAGLAN,XXL,STERILE: Brand: MEDLINE

## (undated) DEVICE — BANDAGE COMPR W6INXL10YD ST M E WHITE/BEIGE

## (undated) DEVICE — Z CONVERTED USE 2276088 BANDAGE ADH W4INXL5YD WHT E AIR PERM TENSOPLAST

## (undated) DEVICE — MEDICINE CUP, GRADUATED, STER: Brand: MEDLINE

## (undated) DEVICE — ZIMMER® STERILE DISPOSABLE TOURNIQUET CUFF WITH PLC, DUAL PORT, SINGLE BLADDER, 18 IN. (46 CM)

## (undated) DEVICE — GLOVE ORTHO 8   MSG9480

## (undated) DEVICE — THIN OFFSET (9.0 X 0.38 X 25.0MM)

## (undated) DEVICE — PADDING,UNDERCAST,COTTON, 4"X4YD STERILE: Brand: MEDLINE

## (undated) DEVICE — ADHESIVE LIQ 2OZ ADJUNCT FOR DSG MASTISOL

## (undated) DEVICE — DRESSING,GAUZE,XEROFORM,CURAD,1"X8",ST: Brand: CURAD

## (undated) DEVICE — DRESSING PETRO W3XL8IN N ADH OIL EMUL GZ CURAD

## (undated) DEVICE — SUTURE VICRYL + SZ 4-0 L27IN ABSRB UD L26MM SH 1/2 CIR VCP415H

## (undated) DEVICE — 3M™ STERI-STRIP™ COMPOUND BENZOIN TINCTURE 40 BAGS/CARTON 4 CARTONS/CASE C1544: Brand: 3M™ STERI-STRIP™

## (undated) DEVICE — C-WIRE PAK DOUBLE ENDED ORTHOPAEDIC WIRE, SPADE, .062" (1.57 MM)
Type: IMPLANTABLE DEVICE | Site: ANKLE | Status: NON-FUNCTIONAL
Brand: C-WIRE
Removed: 2022-02-10

## (undated) DEVICE — LIQUIBAND RAPID ADHESIVE 36/CS 0.8ML: Brand: MEDLINE

## (undated) DEVICE — SYRINGE,CONTROL,LL,FINGER,GRIP: Brand: MEDLINE INDUSTRIES, INC.

## (undated) DEVICE — BIT CAN DRILL 4.9MM/L240MM

## (undated) DEVICE — SYRINGE TB 1ML NDL 27GA L0.5IN PLAS W/ SFTY LOK PERM NDL

## (undated) DEVICE — COVER,TABLE,77X90,STERILE: Brand: MEDLINE

## (undated) DEVICE — Z DISCONTINUED USE 2272117 DRAPE SURG 3 QTR N INVASIVE 2 LAYR DISP

## (undated) DEVICE — SYRINGE MED 10ML TRNSLUC BRL PLUNG BLK MRK POLYPR CTRL

## (undated) DEVICE — SUTURE VCRL 5-0 L18IN ABSRB UD PS-2 L19MM 1/2 CIR J495H

## (undated) DEVICE — MASC TURNOVER KIT: Brand: MEDLINE INDUSTRIES, INC.

## (undated) DEVICE — SOLUTION IV 1000ML 0.9% SOD CHL

## (undated) DEVICE — COVER,MAYO STAND,XL,STERILE: Brand: MEDLINE

## (undated) DEVICE — TOWEL,STOP FLAG GOLD N-W: Brand: MEDLINE

## (undated) DEVICE — 2.5MM EGG RESURFACING TOOL

## (undated) DEVICE — GLOVE SURG SZ 85 CRM LTX FREE POLYISOPRENE POLYMER BEAD ANTI

## (undated) DEVICE — BANDAGE COBAN 4 IN COMPR W4INXL5YD FOAM COHESIVE QUIK STK SELF ADH SFT

## (undated) DEVICE — SUTURE VCRL SZ 5-0 L18IN ABSRB UD L16MM PC-3 3/8 CIR PRIM J844G

## (undated) DEVICE — MASTISOL ADHESIVE LIQ 2/3ML

## (undated) DEVICE — TOWEL,OR,DSP,ST,BLUE,STD,6/PK,12PK/CS: Brand: MEDLINE

## (undated) DEVICE — PROTECTOR ULN NRV PUR FOAM HK LOOP STRP ANATOMICALLY

## (undated) DEVICE — SUTURE VCRL + SZ 4-0 L27IN ABSRB UD L26MM SH 1/2 CIR VCP415H

## (undated) DEVICE — DRILL: Brand: SONICFUSION

## (undated) DEVICE — SPEEDGUIDE DRILL AO: Brand: VARIAX

## (undated) DEVICE — TRAY SKIN PREP WET W/ SCRB AND PAINT PVP I SOLN AND 5

## (undated) DEVICE — NEEDLE HYPO 18GA L1.5IN THN WALL PIVOTING SHLD BVL ORIENTED

## (undated) DEVICE — STRIP,CLOSURE,WOUND,MEDI-STRIP,1/4X3: Brand: MEDLINE

## (undated) DEVICE — PLATE ES AD W 9FT CRD 2

## (undated) DEVICE — SOLUTION IRRIG 1000ML 0.9% SOD CHL USP POUR PLAS BTL

## (undated) DEVICE — SET INTRO SHTH MIC L7CM DIA7FR 0.018IN NDL L2.75IN DIA21GA

## (undated) DEVICE — TOWEL,OR,DSP,ST,BLUE,STD,4/PK,20PK/CS: Brand: MEDLINE

## (undated) DEVICE — SUTURE VCRL + SZ 5 0 L18IN ABSRB UD PS 2 L19MM PRIM REV CUT VCP495G

## (undated) DEVICE — DECANTER BAG 9": Brand: MEDLINE INDUSTRIES, INC.

## (undated) DEVICE — Z DISCONTINUED PER MEDLINE TRAY SKIN PREP DRY W/ PREM GLV APPLICATORS GZ TWL OVERWRAP